# Patient Record
Sex: MALE | Race: WHITE | Employment: OTHER | URBAN - NONMETROPOLITAN AREA
[De-identification: names, ages, dates, MRNs, and addresses within clinical notes are randomized per-mention and may not be internally consistent; named-entity substitution may affect disease eponyms.]

---

## 2017-08-31 RX ORDER — LISINOPRIL 10 MG/1
TABLET ORAL
Qty: 90 TABLET | Refills: 1 | Status: SHIPPED | OUTPATIENT
Start: 2017-08-31

## 2024-07-25 ENCOUNTER — APPOINTMENT (OUTPATIENT)
Dept: CT IMAGING | Age: 79
End: 2024-07-25
Payer: OTHER GOVERNMENT

## 2024-07-25 ENCOUNTER — APPOINTMENT (OUTPATIENT)
Dept: GENERAL RADIOLOGY | Age: 79
End: 2024-07-25
Payer: OTHER GOVERNMENT

## 2024-07-25 ENCOUNTER — HOSPITAL ENCOUNTER (EMERGENCY)
Age: 79
Discharge: ANOTHER HOSPITAL | End: 2024-07-25
Attending: EMERGENCY MEDICINE
Payer: OTHER GOVERNMENT

## 2024-07-25 VITALS
SYSTOLIC BLOOD PRESSURE: 108 MMHG | RESPIRATION RATE: 18 BRPM | WEIGHT: 125 LBS | HEIGHT: 65 IN | TEMPERATURE: 98.2 F | BODY MASS INDEX: 20.83 KG/M2 | OXYGEN SATURATION: 96 % | DIASTOLIC BLOOD PRESSURE: 78 MMHG | HEART RATE: 78 BPM

## 2024-07-25 DIAGNOSIS — I63.9 ACUTE CVA (CEREBROVASCULAR ACCIDENT) (HCC): Primary | ICD-10-CM

## 2024-07-25 LAB
ALBUMIN SERPL-MCNC: 4.1 G/DL (ref 3.5–5.2)
ALP SERPL-CCNC: 81 U/L (ref 40–130)
ALT SERPL-CCNC: 20 U/L (ref 5–41)
ANION GAP SERPL CALCULATED.3IONS-SCNC: 9 MMOL/L (ref 7–19)
AST SERPL-CCNC: 23 U/L (ref 5–40)
BASOPHILS # BLD: 0 K/UL (ref 0–0.2)
BASOPHILS NFR BLD: 0.3 % (ref 0–1)
BILIRUB SERPL-MCNC: 0.4 MG/DL (ref 0.2–1.2)
BUN SERPL-MCNC: 25 MG/DL (ref 8–23)
CALCIUM SERPL-MCNC: 9.2 MG/DL (ref 8.8–10.2)
CHLORIDE SERPL-SCNC: 103 MMOL/L (ref 98–111)
CO2 SERPL-SCNC: 26 MMOL/L (ref 22–29)
CREAT SERPL-MCNC: 1 MG/DL (ref 0.5–1.2)
EKG P AXIS: 95 DEGREES
EKG P-R INTERVAL: 146 MS
EKG Q-T INTERVAL: 382 MS
EKG QRS DURATION: 96 MS
EKG QTC CALCULATION (BAZETT): 429 MS
EKG T AXIS: 65 DEGREES
EOSINOPHIL # BLD: 0.1 K/UL (ref 0–0.6)
EOSINOPHIL NFR BLD: 0.8 % (ref 0–5)
ERYTHROCYTE [DISTWIDTH] IN BLOOD BY AUTOMATED COUNT: 13.2 % (ref 11.5–14.5)
GLUCOSE BLD-MCNC: 105 MG/DL (ref 70–99)
GLUCOSE SERPL-MCNC: 119 MG/DL (ref 74–109)
HCT VFR BLD AUTO: 41.5 % (ref 42–52)
HGB BLD-MCNC: 13.4 G/DL (ref 14–18)
IMM GRANULOCYTES # BLD: 0 K/UL
INR PPP: 0.96 (ref 0.88–1.18)
LYMPHOCYTES # BLD: 1.6 K/UL (ref 1.1–4.5)
LYMPHOCYTES NFR BLD: 22.6 % (ref 20–40)
MCH RBC QN AUTO: 30.6 PG (ref 27–31)
MCHC RBC AUTO-ENTMCNC: 32.3 G/DL (ref 33–37)
MCV RBC AUTO: 94.7 FL (ref 80–94)
MONOCYTES # BLD: 0.5 K/UL (ref 0–0.9)
MONOCYTES NFR BLD: 6.8 % (ref 0–10)
NEUTROPHILS # BLD: 5 K/UL (ref 1.5–7.5)
NEUTS SEG NFR BLD: 69.2 % (ref 50–65)
PERFORMED ON: ABNORMAL
PLATELET # BLD AUTO: 210 K/UL (ref 130–400)
PMV BLD AUTO: 10.4 FL (ref 9.4–12.4)
POTASSIUM SERPL-SCNC: 3.9 MMOL/L (ref 3.5–5)
PROT SERPL-MCNC: 6.9 G/DL (ref 6.6–8.7)
PROTHROMBIN TIME: 12.5 SEC (ref 12–14.6)
RBC # BLD AUTO: 4.38 M/UL (ref 4.7–6.1)
SODIUM SERPL-SCNC: 138 MMOL/L (ref 136–145)
TROPONIN, HIGH SENSITIVITY: 15 NG/L (ref 0–22)
WBC # BLD AUTO: 7.2 K/UL (ref 4.8–10.8)

## 2024-07-25 PROCEDURE — 70498 CT ANGIOGRAPHY NECK: CPT

## 2024-07-25 PROCEDURE — 0042T CT BRAIN PERFUSION: CPT

## 2024-07-25 PROCEDURE — 36415 COLL VENOUS BLD VENIPUNCTURE: CPT

## 2024-07-25 PROCEDURE — 80053 COMPREHEN METABOLIC PANEL: CPT

## 2024-07-25 PROCEDURE — 82962 GLUCOSE BLOOD TEST: CPT

## 2024-07-25 PROCEDURE — 99285 EMERGENCY DEPT VISIT HI MDM: CPT

## 2024-07-25 PROCEDURE — 70450 CT HEAD/BRAIN W/O DYE: CPT

## 2024-07-25 PROCEDURE — 84484 ASSAY OF TROPONIN QUANT: CPT

## 2024-07-25 PROCEDURE — 71045 X-RAY EXAM CHEST 1 VIEW: CPT

## 2024-07-25 PROCEDURE — 85610 PROTHROMBIN TIME: CPT

## 2024-07-25 PROCEDURE — 85025 COMPLETE CBC W/AUTO DIFF WBC: CPT

## 2024-07-25 PROCEDURE — 6360000004 HC RX CONTRAST MEDICATION: Performed by: PHYSICIAN ASSISTANT

## 2024-07-25 RX ORDER — LABETALOL HYDROCHLORIDE 5 MG/ML
20 INJECTION, SOLUTION INTRAVENOUS ONCE
Status: DISCONTINUED | OUTPATIENT
Start: 2024-07-25 | End: 2024-07-25 | Stop reason: HOSPADM

## 2024-07-25 RX ADMIN — IOPAMIDOL 125 ML: 755 INJECTION, SOLUTION INTRAVENOUS at 08:46

## 2024-07-25 ASSESSMENT — ENCOUNTER SYMPTOMS
DIARRHEA: 0
PHOTOPHOBIA: 0
SHORTNESS OF BREATH: 0
ABDOMINAL PAIN: 0
NAUSEA: 0
VOMITING: 0

## 2024-07-25 NOTE — ED NOTES
Rica Simon RN called Code Stroke, CT aware 0829  Announced Over PA 0829  Alerted Dr Momin 0835  Alerted Stroke Coordinator 0838  LKW 2100 last night

## 2024-07-25 NOTE — ED PROVIDER NOTES
Attending Supervisory Note/Shared Visit   I have personally performed a face to face diagnostic evaluation on this patient. I have reviewed the mid-level’s findings and agree.     Briefly, patient presents to the ED for evaluation regarding onset of facial drooping and motor weakness.  He noticed the symptoms when he awoke this morning and was transported here to the ED by his son.  Patient does have a prior history of atrial fibrillation and is maintained on Eliquis therapy.  No prior history of previous CVA.  Patient's wife and patient estimate that he went to bed around 9 PM last night and was his normal self at that time.    On my examination patient is alert and answering basic questions.  He is noted to have left-sided facial drooping and motor weakness involving the left upper extremity.  His lungs are clear bilaterally.  His vital signs are stable.      Patient's CT imaging demonstrates evidence of LVO.  He is not a candidate for thrombolytics due to last known well of 9 PM last night.       CT Brain:      CT Perfusion:      Case was discussed with [facility] who is agreeable to auto-accept patient in ED to ED transfer.  Accepting provider is Dr. Herbert.  Patient will be transferred via aeromedical transport.    Case was discussed with neurointerventional fellow.    CRITICAL CARE TIME   Total Critical Care time was 44 minutes, excluding separately reportable procedures.  There was a high probability of clinically significant/life threatening deterioration in the patient's condition which required my urgent intervention.  Patient required my immediate presence at the bedside.  Multiple reexaminations were undertaken throughout ED course of care.  Time was spent reviewing plan of care with ED nursing staff.  Time is inclusive of  and clinical documentation.        FINAL IMPRESSION      1. Acute CVA (cerebrovascular accident) (HCC)          Owen Hogan MD  Attending Emergency 
within normal limits   TROPONIN   PROTIME-INR   POCT GLUCOSE       All other labs were within normal range or not returned as of this dictation.    EMERGENCY DEPARTMENT COURSE and DIFFERENTIAL DIAGNOSIS/MDM:   Vitals:    Vitals:    07/25/24 0828 07/25/24 0851   BP: (!) 142/115 126/72   Pulse: 71 76   Resp: 18 18   Temp: 98.4 °F (36.9 °C)    SpO2: 96% 96%   Weight: 56.7 kg (125 lb)    Height: 1.651 m (5' 5\")        MDM  Radiology notes subacute ischemic infarct. [facility] was immediately called due to concern for LVO noted on perfusion scan. Patient was also seen by attending Dr Hogan who spoke with [facility]. Patient was accepted at [facility]. CT perfusion shows MCA ischemic infarct per radiologist. EKG and trop neg for cardiac etiology. Blood work otherwise unremarkable. Plan for air transport. See attending note for further detail.     CONSULTS:  [facility]    PROCEDURES:  Unless otherwise noted below, none     Procedures    FINAL IMPRESSION      1. Acute CVA (cerebrovascular accident) (HCC)          DISPOSITION/PLAN   DISPOSITION Decision To Transfer 07/25/2024 08:53:07 AM      PATIENT REFERRED TO:  No follow-up provider specified.    DISCHARGE MEDICATIONS:  New Prescriptions    No medications on file          (Please note that portions of this note were completed with a voice recognition program.  Efforts were made to edit thedictations but occasionally words are mis-transcribed.)    SHERIN Whitaker (electronically signed)       Ulisses Garcia PA  07/25/24 0915

## 2024-07-29 ENCOUNTER — TELEPHONE (OUTPATIENT)
Dept: UROLOGY | Age: 79
End: 2024-07-29

## 2024-07-29 LAB
EKG P AXIS: 95 DEGREES
EKG P-R INTERVAL: 146 MS
EKG Q-T INTERVAL: 382 MS
EKG QRS DURATION: 96 MS
EKG QTC CALCULATION (BAZETT): 429 MS
EKG T AXIS: 65 DEGREES

## 2024-07-29 NOTE — TELEPHONE ENCOUNTER
Jairon Daughter May called to schedule a  hospital follow up for a Cath removal due to Prostate swelling .     Please be advised that the best time to call him to accommodate their needs is  as soon as possible .     Thank you.

## 2024-08-02 ENCOUNTER — OFFICE VISIT (OUTPATIENT)
Dept: UROLOGY | Age: 79
End: 2024-08-02
Payer: MEDICARE

## 2024-08-02 VITALS — TEMPERATURE: 98.6 F | WEIGHT: 125 LBS | HEIGHT: 65 IN | BODY MASS INDEX: 20.83 KG/M2

## 2024-08-02 DIAGNOSIS — R33.8 BENIGN PROSTATIC HYPERPLASIA WITH URINARY RETENTION: Primary | ICD-10-CM

## 2024-08-02 DIAGNOSIS — N40.1 BENIGN PROSTATIC HYPERPLASIA WITH URINARY RETENTION: Primary | ICD-10-CM

## 2024-08-02 DIAGNOSIS — Z87.898 HISTORY OF ELEVATED PSA: ICD-10-CM

## 2024-08-02 DIAGNOSIS — N40.2 PROSTATE NODULE: ICD-10-CM

## 2024-08-02 PROCEDURE — 99204 OFFICE O/P NEW MOD 45 MIN: CPT | Performed by: NURSE PRACTITIONER

## 2024-08-02 PROCEDURE — 1123F ACP DISCUSS/DSCN MKR DOCD: CPT | Performed by: NURSE PRACTITIONER

## 2024-08-02 PROCEDURE — G8427 DOCREV CUR MEDS BY ELIG CLIN: HCPCS | Performed by: NURSE PRACTITIONER

## 2024-08-02 PROCEDURE — G8420 CALC BMI NORM PARAMETERS: HCPCS | Performed by: NURSE PRACTITIONER

## 2024-08-02 PROCEDURE — 4004F PT TOBACCO SCREEN RCVD TLK: CPT | Performed by: NURSE PRACTITIONER

## 2024-08-02 RX ORDER — ATORVASTATIN CALCIUM 80 MG/1
80 TABLET, FILM COATED ORAL DAILY
COMMUNITY
Start: 2024-07-27

## 2024-08-02 RX ORDER — TAMSULOSIN HYDROCHLORIDE 0.4 MG/1
CAPSULE ORAL
COMMUNITY
Start: 2024-07-27

## 2024-08-02 ASSESSMENT — ENCOUNTER SYMPTOMS
VOMITING: 0
ABDOMINAL PAIN: 0
NAUSEA: 0
BACK PAIN: 0
ABDOMINAL DISTENTION: 0

## 2024-08-02 NOTE — PROGRESS NOTES
Jairon Rose is a 79 y.o. male who presents today   Chief Complaint   Patient presents with    New Patient     I am here today for my fill & pull.       Patient is an 79-year-old male presents to the clinic today for voiding trial.  He is a new patient to me.  He presented to the [facility] ER on 7/25/24 confirmed MCA ischemic infarct transferred to [facility] for thrombectomy.  He was discharged on 7/27/2024.  During that admission he was found to have a lung tumor suggestive to be malignant on a CT of the chest however he does not want to pursue treatment for this.  He developed urinary retention likely secondary to CVA.  Multiple attempts to place the catheter by nursing staff were unsuccessful therefore consult to urology for a complex Chapman catheter insertion.  He was started on Flomax and has been on this for about a week.  He has a long history of BPH has never been on medications in the past.  History of elevated PSAs has 2 previous biopsies these were negative however he no longer obtains PSAs.  Previously seen by Dr. Singer.  At baseline he does have a history of incomplete bladder emptying, frequency, urgency, weak stream, nocturia.  He is eager to have his Chapman removed today.      History reviewed. No pertinent past medical history.    History reviewed. No pertinent surgical history.    Current Outpatient Medications   Medication Sig Dispense Refill    atorvastatin (LIPITOR) 80 MG tablet Take 1 tablet by mouth daily      tamsulosin (FLOMAX) 0.4 MG capsule TAKE 1 CAPSULE BY MOUTH ONCE DAILY IN THE EVENING      metoprolol tartrate (LOPRESSOR) 25 MG tablet Take 1 tablet by mouth 2 times daily      lisinopril (PRINIVIL;ZESTRIL) 10 MG tablet TAKE ONE TABLET BY MOUTH ONCE DAILY 90 tablet 1    metoprolol tartrate (LOPRESSOR) 50 MG tablet TAKE ONE-HALF TABLET BY MOUTH ONCE DAILY 45 tablet 5     No current facility-administered medications for this visit.       No Known Allergies    Social History     Socioeconomic

## 2024-08-02 NOTE — PROGRESS NOTES
Patient of Yeny Whyte presents today for voiding trial post BPH with retention. The patient denies any fever, chills or  N&V. After patient had given consent, using the catheter in place, 240cc of sterile water was installed into the bladder with no complications. Patient was able to void 100.     Yeny Whyte was in office at time of procedure.     Patient was advised to drink clear fluids for the next couple hours and urinate. Patient was advised they may experience some blood in the urine and burning with urination for the next couple days. If the patient is unable to urinate or develops fever, chills, N&V or suprapubic pain, they will call office for an appt at clinic or seek medical treatment at nearest ER, PCP or Urgent Care if after hours. Patient verbalized understanding and all questions were answered.Patient advised to call the office with any questions or concerns.     Patient is to follow up as scheduled. Procedure Note (8/2/24):  After receiving verbal orders from Yeny Whyte, I was instructed to place urethral osman catheter for being unable to pass fill & pull. Using sterile technique, patient was cleaned with Hibiclens and sterile water solution. A 18f coude catheter was inserted into the bladder, the bladder was drained of 400cc of urine, and 10 mL of sterile water was used to fill up the balloon. The catheter was then hooked up to a leg (leg/bedside) drainage bag.   The patient tolerated the procedure well.     Patient should follow up in 1 week as scheduled for voiding trial.

## 2024-08-09 ENCOUNTER — OFFICE VISIT (OUTPATIENT)
Dept: UROLOGY | Age: 79
End: 2024-08-09

## 2024-08-09 VITALS — HEIGHT: 65 IN | BODY MASS INDEX: 20.83 KG/M2 | WEIGHT: 125 LBS

## 2024-08-09 DIAGNOSIS — Z87.898 HISTORY OF ELEVATED PSA: ICD-10-CM

## 2024-08-09 DIAGNOSIS — R33.8 BENIGN PROSTATIC HYPERPLASIA WITH URINARY RETENTION: ICD-10-CM

## 2024-08-09 DIAGNOSIS — N40.2 PROSTATE NODULE: ICD-10-CM

## 2024-08-09 DIAGNOSIS — N40.1 BENIGN PROSTATIC HYPERPLASIA WITH URINARY RETENTION: ICD-10-CM

## 2024-08-09 DIAGNOSIS — R33.9 URINARY RETENTION: Primary | ICD-10-CM

## 2024-08-09 RX ORDER — TAMSULOSIN HYDROCHLORIDE 0.4 MG/1
0.8 CAPSULE ORAL NIGHTLY
Qty: 180 CAPSULE | Refills: 3 | Status: ON HOLD | OUTPATIENT
Start: 2024-08-09

## 2024-08-09 ASSESSMENT — ENCOUNTER SYMPTOMS
VOMITING: 0
NAUSEA: 0
ABDOMINAL DISTENTION: 0
BACK PAIN: 0
ABDOMINAL PAIN: 0

## 2024-08-09 NOTE — PROGRESS NOTES
Patient of AUBREE Argueta presents today for voiding trial post episode of urinary retention. The patient denies any fever, chills or  N&V. After patient had given consent, using the catheter in place, 240cc of sterile water was installed into the bladder with no complications. Patient was able to void 225cc.     AUBREE Argueta was in office at time of procedure.     Patient was advised to drink clear fluids for the next couple hours and urinate. Patient was advised they may experience some blood in the urine and burning with urination for the next couple days. If the patient is unable to urinate or develops fever, chills, N&V or suprapubic pain, they will call office for an appt at clinic or seek medical treatment at nearest ER, PCP or Urgent Care if after hours. Patient verbalized understanding and all questions were answered.Patient advised to call the office with any questions or concerns.     Patient is to follow up as scheduled.   
  Pulmonary:      Effort: Pulmonary effort is normal. No respiratory distress.   Abdominal:      General: There is no distension.      Tenderness: There is no abdominal tenderness. There is no right CVA tenderness or left CVA tenderness.   Neurological:      Mental Status: He is alert and oriented to person, place, and time. Mental status is at baseline.   Psychiatric:         Mood and Affect: Mood normal.         Behavior: Behavior normal.         ASSESSMENT/PLAN  1. Urinary retention  Voiding trial today.  Patient was able to urinate to 25 mL of the 240 mL instilled into his bladder.  This is an improvement from his previous voiding trial.  He is now maintained on Flomax 0.8 mg.  He is hopeful he will not need catheter replacement.  We will plan on having him follow-up in about 2 weeks for bladder scan.  If he is doing well on medical management, he can continue this therapy.  If he is unable to empty his bladder today, patient is aware he will need to pursue CIC versus catheter placement.  - tamsulosin (FLOMAX) 0.4 MG capsule; Take 2 capsules by mouth at bedtime  Dispense: 180 capsule; Refill: 3    2. Benign prostatic hyperplasia with urinary retention  Enlarged prostate with 2 episodes of urinary retention.  Was able to void in office.  Continue Flomax.  - tamsulosin (FLOMAX) 0.4 MG capsule; Take 2 capsules by mouth at bedtime  Dispense: 180 capsule; Refill: 3    3. Prostate nodule  4. History of elevated PSA  Patient with prior history of elevated PSA with 2 prior negative biopsies.  I do not have previous records regarding the pathologies.  Patient was noted to have a prostate nodule on his AUGIE by AUBREE Saini.  Patient does not wish to pursue any further evaluation regarding screening for prostate cancer.      No orders of the defined types were placed in this encounter.       Return in about 2 weeks (around 8/23/2024) for recheck pvr.    An electronic signature was used to authenticate this note.    SRIKANTH

## 2024-08-11 ENCOUNTER — HOSPITAL ENCOUNTER (INPATIENT)
Age: 79
LOS: 2 days | Discharge: HOME | DRG: 683 | End: 2024-08-13
Attending: PEDIATRICS | Admitting: INTERNAL MEDICINE
Payer: OTHER GOVERNMENT

## 2024-08-11 DIAGNOSIS — R33.9 URINARY RETENTION: Primary | ICD-10-CM

## 2024-08-11 DIAGNOSIS — N13.9 OBSTRUCTIVE UROPATHY: ICD-10-CM

## 2024-08-11 PROBLEM — D72.829 LEUKOCYTOSIS: Status: ACTIVE | Noted: 2024-08-11

## 2024-08-11 PROBLEM — N17.9 AKI (ACUTE KIDNEY INJURY) (HCC): Status: ACTIVE | Noted: 2024-08-11

## 2024-08-11 LAB
ALBUMIN SERPL-MCNC: 4 G/DL (ref 3.5–5.2)
ALP SERPL-CCNC: 114 U/L (ref 40–130)
ALT SERPL-CCNC: 24 U/L (ref 5–41)
ANION GAP SERPL CALCULATED.3IONS-SCNC: 14 MMOL/L (ref 7–19)
AST SERPL-CCNC: 29 U/L (ref 5–40)
BACTERIA URNS QL MICRO: NEGATIVE /HPF
BASOPHILS # BLD: 0 K/UL (ref 0–0.2)
BASOPHILS NFR BLD: 0.1 % (ref 0–1)
BILIRUB SERPL-MCNC: 0.8 MG/DL (ref 0.2–1.2)
BILIRUB UR QL STRIP: NEGATIVE
BUN SERPL-MCNC: 32 MG/DL (ref 8–23)
CALCIUM SERPL-MCNC: 9.4 MG/DL (ref 8.8–10.2)
CHLORIDE SERPL-SCNC: 99 MMOL/L (ref 98–111)
CLARITY UR: CLEAR
CO2 SERPL-SCNC: 21 MMOL/L (ref 22–29)
COLOR UR: YELLOW
CREAT SERPL-MCNC: 2.7 MG/DL (ref 0.5–1.2)
CREAT UR-MCNC: 146.2 MG/DL (ref 39–259)
CRYSTALS URNS MICRO: ABNORMAL /HPF
EOSINOPHIL # BLD: 0 K/UL (ref 0–0.6)
EOSINOPHIL NFR BLD: 0.1 % (ref 0–5)
EOSINOPHIL URNS QL MICRO: NORMAL
EPI CELLS #/AREA URNS AUTO: 0 /HPF (ref 0–5)
ERYTHROCYTE [DISTWIDTH] IN BLOOD BY AUTOMATED COUNT: 12.7 % (ref 11.5–14.5)
GLUCOSE SERPL-MCNC: 148 MG/DL (ref 74–109)
GLUCOSE UR STRIP.AUTO-MCNC: NEGATIVE MG/DL
HCT VFR BLD AUTO: 36.1 % (ref 42–52)
HGB BLD-MCNC: 12.4 G/DL (ref 14–18)
HGB UR STRIP.AUTO-MCNC: ABNORMAL MG/L
HYALINE CASTS #/AREA URNS AUTO: 0 /HPF (ref 0–8)
IMM GRANULOCYTES # BLD: 0.1 K/UL
KETONES UR STRIP.AUTO-MCNC: NEGATIVE MG/DL
LACTATE BLDV-SCNC: 1.2 MMOL/L (ref 0.5–1.9)
LEUKOCYTE ESTERASE UR QL STRIP.AUTO: NEGATIVE
LYMPHOCYTES # BLD: 1 K/UL (ref 1.1–4.5)
LYMPHOCYTES NFR BLD: 6.1 % (ref 20–40)
MCH RBC QN AUTO: 31 PG (ref 27–31)
MCHC RBC AUTO-ENTMCNC: 34.3 G/DL (ref 33–37)
MCV RBC AUTO: 90.3 FL (ref 80–94)
MONOCYTES # BLD: 0.9 K/UL (ref 0–0.9)
MONOCYTES NFR BLD: 5.7 % (ref 0–10)
NEUTROPHILS # BLD: 13.6 K/UL (ref 1.5–7.5)
NEUTS SEG NFR BLD: 87.3 % (ref 50–65)
NITRITE UR QL STRIP.AUTO: NEGATIVE
OSMOLALITY URINE: 516 MOSM/KG (ref 250–1200)
PH UR STRIP.AUTO: 5.5 [PH] (ref 5–8)
PLATELET # BLD AUTO: 305 K/UL (ref 130–400)
PMV BLD AUTO: 10.1 FL (ref 9.4–12.4)
POTASSIUM SERPL-SCNC: 3.9 MMOL/L (ref 3.5–5)
PROT SERPL-MCNC: 7.1 G/DL (ref 6.6–8.7)
PROT UR STRIP.AUTO-MCNC: ABNORMAL MG/DL
RBC # BLD AUTO: 4 M/UL (ref 4.7–6.1)
RBC #/AREA URNS AUTO: 203 /HPF (ref 0–4)
SODIUM SERPL-SCNC: 134 MMOL/L (ref 136–145)
SODIUM UR-SCNC: 66 MMOL/L
SP GR UR STRIP.AUTO: 1.02 (ref 1–1.03)
UROBILINOGEN UR STRIP.AUTO-MCNC: 0.2 E.U./DL
WBC # BLD AUTO: 15.6 K/UL (ref 4.8–10.8)
WBC #/AREA URNS AUTO: 18 /HPF (ref 0–5)

## 2024-08-11 PROCEDURE — 99222 1ST HOSP IP/OBS MODERATE 55: CPT | Performed by: NURSE PRACTITIONER

## 2024-08-11 PROCEDURE — 99285 EMERGENCY DEPT VISIT HI MDM: CPT

## 2024-08-11 PROCEDURE — 83935 ASSAY OF URINE OSMOLALITY: CPT

## 2024-08-11 PROCEDURE — 80053 COMPREHEN METABOLIC PANEL: CPT

## 2024-08-11 PROCEDURE — 6370000000 HC RX 637 (ALT 250 FOR IP): Performed by: NURSE PRACTITIONER

## 2024-08-11 PROCEDURE — 2580000003 HC RX 258: Performed by: NURSE PRACTITIONER

## 2024-08-11 PROCEDURE — 87040 BLOOD CULTURE FOR BACTERIA: CPT

## 2024-08-11 PROCEDURE — 87086 URINE CULTURE/COLONY COUNT: CPT

## 2024-08-11 PROCEDURE — 82570 ASSAY OF URINE CREATININE: CPT

## 2024-08-11 PROCEDURE — 6370000000 HC RX 637 (ALT 250 FOR IP): Performed by: PEDIATRICS

## 2024-08-11 PROCEDURE — 87186 SC STD MICRODIL/AGAR DIL: CPT

## 2024-08-11 PROCEDURE — 84300 ASSAY OF URINE SODIUM: CPT

## 2024-08-11 PROCEDURE — 51702 INSERT TEMP BLADDER CATH: CPT

## 2024-08-11 PROCEDURE — 2580000003 HC RX 258: Performed by: PEDIATRICS

## 2024-08-11 PROCEDURE — 1200000000 HC SEMI PRIVATE

## 2024-08-11 PROCEDURE — 36415 COLL VENOUS BLD VENIPUNCTURE: CPT

## 2024-08-11 PROCEDURE — 6360000002 HC RX W HCPCS: Performed by: NURSE PRACTITIONER

## 2024-08-11 PROCEDURE — 81001 URINALYSIS AUTO W/SCOPE: CPT

## 2024-08-11 PROCEDURE — 87205 SMEAR GRAM STAIN: CPT

## 2024-08-11 PROCEDURE — 87077 CULTURE AEROBIC IDENTIFY: CPT

## 2024-08-11 PROCEDURE — 85025 COMPLETE CBC W/AUTO DIFF WBC: CPT

## 2024-08-11 PROCEDURE — 83605 ASSAY OF LACTIC ACID: CPT

## 2024-08-11 RX ORDER — LIDOCAINE HYDROCHLORIDE 20 MG/ML
JELLY TOPICAL PRN
Status: DISCONTINUED | OUTPATIENT
Start: 2024-08-11 | End: 2024-08-13 | Stop reason: HOSPADM

## 2024-08-11 RX ORDER — 0.9 % SODIUM CHLORIDE 0.9 %
1000 INTRAVENOUS SOLUTION INTRAVENOUS ONCE
Status: COMPLETED | OUTPATIENT
Start: 2024-08-11 | End: 2024-08-11

## 2024-08-11 RX ORDER — POLYETHYLENE GLYCOL 3350 17 G/17G
17 POWDER, FOR SOLUTION ORAL DAILY PRN
Status: DISCONTINUED | OUTPATIENT
Start: 2024-08-11 | End: 2024-08-13 | Stop reason: HOSPADM

## 2024-08-11 RX ORDER — ONDANSETRON 2 MG/ML
4 INJECTION INTRAMUSCULAR; INTRAVENOUS EVERY 6 HOURS PRN
Status: DISCONTINUED | OUTPATIENT
Start: 2024-08-11 | End: 2024-08-13 | Stop reason: HOSPADM

## 2024-08-11 RX ORDER — ENOXAPARIN SODIUM 100 MG/ML
30 INJECTION SUBCUTANEOUS EVERY 24 HOURS
Status: DISCONTINUED | OUTPATIENT
Start: 2024-08-11 | End: 2024-08-13 | Stop reason: HOSPADM

## 2024-08-11 RX ORDER — SODIUM CHLORIDE 0.9 % (FLUSH) 0.9 %
5-40 SYRINGE (ML) INJECTION EVERY 12 HOURS SCHEDULED
Status: DISCONTINUED | OUTPATIENT
Start: 2024-08-11 | End: 2024-08-13 | Stop reason: HOSPADM

## 2024-08-11 RX ORDER — TAMSULOSIN HYDROCHLORIDE 0.4 MG/1
0.8 CAPSULE ORAL NIGHTLY
Status: DISCONTINUED | OUTPATIENT
Start: 2024-08-11 | End: 2024-08-13 | Stop reason: HOSPADM

## 2024-08-11 RX ORDER — FINASTERIDE 5 MG/1
5 TABLET, FILM COATED ORAL DAILY
Status: DISCONTINUED | OUTPATIENT
Start: 2024-08-11 | End: 2024-08-13 | Stop reason: HOSPADM

## 2024-08-11 RX ORDER — ACETAMINOPHEN 325 MG/1
650 TABLET ORAL EVERY 6 HOURS PRN
Status: DISCONTINUED | OUTPATIENT
Start: 2024-08-11 | End: 2024-08-13 | Stop reason: HOSPADM

## 2024-08-11 RX ORDER — SODIUM CHLORIDE 9 MG/ML
INJECTION, SOLUTION INTRAVENOUS PRN
Status: DISCONTINUED | OUTPATIENT
Start: 2024-08-11 | End: 2024-08-13 | Stop reason: HOSPADM

## 2024-08-11 RX ORDER — SODIUM CHLORIDE 0.9 % (FLUSH) 0.9 %
5-40 SYRINGE (ML) INJECTION PRN
Status: DISCONTINUED | OUTPATIENT
Start: 2024-08-11 | End: 2024-08-13 | Stop reason: HOSPADM

## 2024-08-11 RX ORDER — ACETAMINOPHEN 650 MG/1
650 SUPPOSITORY RECTAL EVERY 6 HOURS PRN
Status: DISCONTINUED | OUTPATIENT
Start: 2024-08-11 | End: 2024-08-13 | Stop reason: HOSPADM

## 2024-08-11 RX ORDER — SODIUM CHLORIDE 9 MG/ML
INJECTION, SOLUTION INTRAVENOUS CONTINUOUS
Status: DISCONTINUED | OUTPATIENT
Start: 2024-08-11 | End: 2024-08-13 | Stop reason: HOSPADM

## 2024-08-11 RX ORDER — ONDANSETRON 4 MG/1
4 TABLET, ORALLY DISINTEGRATING ORAL EVERY 8 HOURS PRN
Status: DISCONTINUED | OUTPATIENT
Start: 2024-08-11 | End: 2024-08-13 | Stop reason: HOSPADM

## 2024-08-11 RX ADMIN — FINASTERIDE 5 MG: 5 TABLET, FILM COATED ORAL at 17:52

## 2024-08-11 RX ADMIN — ENOXAPARIN SODIUM 30 MG: 100 INJECTION SUBCUTANEOUS at 16:26

## 2024-08-11 RX ADMIN — SODIUM CHLORIDE 1000 ML: 9 INJECTION, SOLUTION INTRAVENOUS at 13:50

## 2024-08-11 RX ADMIN — TAMSULOSIN HYDROCHLORIDE 0.8 MG: 0.4 CAPSULE ORAL at 19:44

## 2024-08-11 RX ADMIN — LIDOCAINE HYDROCHLORIDE: 20 JELLY TOPICAL at 12:09

## 2024-08-11 RX ADMIN — SODIUM CHLORIDE: 9 INJECTION, SOLUTION INTRAVENOUS at 16:29

## 2024-08-11 NOTE — PROGRESS NOTES
4 Eyes Skin Assessment     NAME:  Jairon Rose  YOB: 1945  MEDICAL RECORD NUMBER:  [id number]    The patient is being assessed for  Admission    I agree that at least one RN has performed a thorough Head to Toe Skin Assessment on the patient. ALL assessment sites listed below have been assessed.      Areas assessed by both nurses:    {Pressure Areas Assessed:79655}        Does the Patient have a Wound? No noted wound(s)       Tello Prevention initiated by RN: No  Wound Care Orders initiated by RN: No    Pressure Injury (Stage 3,4, Unstageable, DTI, NWPT, and Complex wounds) if present, place Wound referral order by RN under : No    New Ostomies, if present place, Ostomy referral order under : No     Nurse 1 eSignature: Electronically signed by Mimi Lepe RN on 8/11/24 at 6:23 PM CDT    **SHARE this note so that the co-signing nurse can place an eSignature**    Nurse 2 eSignature: {Esignature:[id number]}

## 2024-08-11 NOTE — CONSULTS
Urology Consult Note    Patient:  Jairon Rose  YOB: 1945  Date of Service: 8/11/2024  MRN: [id number]   Acct: [id number]   Primary Care Physician: Segundo Phipps MD  Advance Directive: Full Code  Admit Date: 8/11/2024       Hospital Day: 0    Chief Complaint: \"He has been having trouble urinating since yesterday at least, maybe even before that,\" states his son.    History:  HPI   Patient presented to the emergency room earlier today with his son for urinary retention.  Patient is quite lethargic, so his son does provide most of the history.  Patient is resting in bed with eyes closed.  Does respond intermittently.  Patient was seen in our office on Friday for voiding trial.  This was his second voiding trial in as many weeks. He initially presented to the ER on 7/25/2024 with a confirmed MCA ischemic infarct and transferred to [facility] for thrombectomy.  During that admission, he was also incidentally found to have a lung tumor with malignant appearance on CT of the chest, although, patient is not planning on pursuing treatment for this.  During his hospitalization, he developed urinary retention, most likely secondary to CVA.  There were multiple attempts by the nursing staff to place his catheter but ultimately required a complex Chapman catheter insertion by the urologist there.  He was discharged with Flomax, and after failing voiding trial last week, Flomax was increased to 0.8 mg.  Prior AUGIE revealed 70 g prostate with nodule.    Again, patient presented to the office on Friday for voiding trial.  He was able to urinate while in the office, but over the next couple days, patient expressed to his family he was having some issues with voiding with his urinary stream being only a dribble.  His son believes the patient may have been exaggerating a bit on the amount of urination occurring in hopes to avoid catheter replacement.  A little over 500 mL of urine was noted to be within his bladder on his

## 2024-08-11 NOTE — ED NOTES
ED TO INPATIENT SBAR HANDOFF    Patient Name: Jairon Rose   : 1945  79 y.o.   Family/Caregiver Present: Yes  Code Status Order: Full Code    C-SSRS: Risk of Suicide: No Risk  Sitter No  Restraints:         Situation  Chief Complaint:   Chief Complaint   Patient presents with    Dysuria     Ongoing issues with dysuria, multiple catheter insertions that have been placed by Urology; thelma Magallanes     Patient Diagnosis: BREE (acute kidney injury) (HCC) [N17.9]     Brief Description of Patient's Condition: Pt being admitted for BREE. Pt came to ED today with c/o of dysuria and retention. For the last two days pt has been having urgency but not able to produce much urine. Pt had a stroke two weeks ago and has been having urinary issues and has had multiple catheter insertions. Pt has a 16g osman in at this time. Lab recently called and said that they needed more urine to run more tests. Catheter was just emptied so  more urine will need to be sent for further testing. Cultures ordered by [facility] and lactic recently sent. Pt on RA and oriented x4. Pt has had trouble sleeping the last few days but is resting now.  Mental Status: oriented, alert, coherent, logical, and thought processes intact  Arrived from: home    Imaging:   US RENAL COMPLETE    (Results Pending)     COVID-19 Results:   Internal Administration   First Dose      Second Dose           Last COVID Lab No results found for: \"SARS-COV-2\"        Abnormal labs:   Abnormal Labs Reviewed   CBC WITH AUTO DIFFERENTIAL - Abnormal; Notable for the following components:       Result Value    WBC 15.6 (*)     RBC 4.00 (*)     Hemoglobin 12.4 (*)     Hematocrit 36.1 (*)     Neutrophils % 87.3 (*)     Lymphocytes % 6.1 (*)     Neutrophils Absolute 13.6 (*)     Lymphocytes Absolute 1.0 (*)     All other components within normal limits   COMPREHENSIVE METABOLIC PANEL - Abnormal; Notable for the following components:    Sodium 134 (*)     CO2 21 (*)     Glucose

## 2024-08-11 NOTE — H&P
[facility]      Hospitalist - History & Physical      PCP: Segundo Phipps MD    Date of Admission: 8/11/2024    Date of Service: 8/11/2024    Chief Complaint:  Difficulty urinating     History Of Present Illness:   The patient is a 79 y.o. male who presented to [facility] ED for evaluation of difficulty with urination. Pt has history of stroke, HTN and BPH.    Pt is here with his son who assists with history. Pt was evaluated over two weeks ago at [facility] having had thrombectomy for stroke. Pt developed problems while there with urinary retention and osman catheter was placed.     Pt has follow up with Dr. Montgomery's office 3 days ago having had osman catheter removed and having passed voiding trial however developed return in problem urinating yesterday. He has been dribbling urine unable to empty bladder having had suprapubic abdominal discomfort and episode of vomiting today with symptom resolution with osman catheter placement.     In ED, greater than 500ml urine noted on bedside bladder scan per ED nurse. Osman catheter was placed. Creatinine 2.7/bun 32-creatinine 1.0 on 7/25/2024. 1L ns fluid bolus given. Wbc 15.6k,hgb 12.4, platelets 305k, UA micro-negative bacteria, 203 rbc, 18 wbc, 0 epi cells. Pt is admitted observation to hospitalist.     Past Medical History:    Stroke  HTN  BPH  Urinary retention  Lung tumor   Prostate nodule    Past Surgical History:    Thrombectomy     Home Medications:  Prior to Admission medications    Medication Sig Start Date End Date Taking? Authorizing Provider   tamsulosin (FLOMAX) 0.4 MG capsule Take 2 capsules by mouth at bedtime 8/9/24   Sofia Fernando, APRN - CNP   atorvastatin (LIPITOR) 80 MG tablet Take 1 tablet by mouth daily 7/27/24   Provider, MD Frandy   metoprolol tartrate (LOPRESSOR) 25 MG tablet Take 1 tablet by mouth 2 times daily 5/15/24   ProviderFrandy MD   lisinopril (PRINIVIL;ZESTRIL) 10 MG tablet TAKE ONE TABLET BY MOUTH ONCE

## 2024-08-11 NOTE — ED PROVIDER NOTES
WITH REFLEX TO CULTURE - Abnormal; Notable for the following components:    Blood, Urine LARGE (*)     Protein, UA TRACE (*)     All other components within normal limits   MICROSCOPIC URINALYSIS - Abnormal; Notable for the following components:    WBC, UA 18 (*)     RBC,  (*)     All other components within normal limits   BASIC METABOLIC PANEL W/ REFLEX TO MG FOR LOW K - Abnormal; Notable for the following components:    CO2 20 (*)     BUN 30 (*)     Creatinine 1.6 (*)     Est, Glom Filt Rate 43 (*)     Calcium 8.2 (*)     All other components within normal limits   CBC - Abnormal; Notable for the following components:    WBC 10.9 (*)     RBC 3.62 (*)     Hemoglobin 11.2 (*)     Hematocrit 33.3 (*)     All other components within normal limits   CULTURE, BLOOD 1   CULTURE, BLOOD 2   LACTIC ACID   SODIUM, URINE, RANDOM   CREATININE, RANDOM URINE   OSMOLALITY, URINE   EOSINOPHIL SMEAR, URINE               All other labs were within normal range or not returned as of this dictation.    EMERGENCY DEPARTMENT COURSE and DIFFERENTIAL DIAGNOSIS/MDM:   Vitals:    Vitals:    08/11/24 1601 08/11/24 2032 08/12/24 0516 08/12/24 0756   BP: (!) 130/57 (!) 117/50 (!) 130/54    Pulse: 71 59 73 68   Resp:  18 18 16   Temp: 99 °F (37.2 °C) 98.1 °F (36.7 °C) 98.2 °F (36.8 °C)    TempSrc: Oral Oral     SpO2: 92% 97% 97% 96%   Weight:       Height:           MDM     Amount and/or Complexity of Data Reviewed  Clinical lab tests: reviewed  Decide to obtain previous medical records or to obtain history from someone other than the patient: yes    79-year-old male with history of prostate enlargement and urinary retention presents to the emergency department 2 days after having Chapman catheter removed.  Patient has been \"leaking urine\" but has been unable to successfully empty his bladder completely.  Patient is uncomfortable secondary to distention of his bladder.  Chapman catheter placed successfully in the emergency department with

## 2024-08-12 ENCOUNTER — APPOINTMENT (OUTPATIENT)
Dept: ULTRASOUND IMAGING | Age: 79
DRG: 683 | End: 2024-08-12
Payer: OTHER GOVERNMENT

## 2024-08-12 LAB
ANION GAP SERPL CALCULATED.3IONS-SCNC: 13 MMOL/L (ref 7–19)
BUN SERPL-MCNC: 30 MG/DL (ref 8–23)
CALCIUM SERPL-MCNC: 8.2 MG/DL (ref 8.8–10.2)
CHLORIDE SERPL-SCNC: 104 MMOL/L (ref 98–111)
CO2 SERPL-SCNC: 20 MMOL/L (ref 22–29)
CREAT SERPL-MCNC: 1.6 MG/DL (ref 0.5–1.2)
ERYTHROCYTE [DISTWIDTH] IN BLOOD BY AUTOMATED COUNT: 13.1 % (ref 11.5–14.5)
GLUCOSE SERPL-MCNC: 106 MG/DL (ref 74–109)
HCT VFR BLD AUTO: 33.3 % (ref 42–52)
HGB BLD-MCNC: 11.2 G/DL (ref 14–18)
MCH RBC QN AUTO: 30.9 PG (ref 27–31)
MCHC RBC AUTO-ENTMCNC: 33.6 G/DL (ref 33–37)
MCV RBC AUTO: 92 FL (ref 80–94)
PLATELET # BLD AUTO: 223 K/UL (ref 130–400)
PMV BLD AUTO: 9.7 FL (ref 9.4–12.4)
POTASSIUM SERPL-SCNC: 3.6 MMOL/L (ref 3.5–5)
RBC # BLD AUTO: 3.62 M/UL (ref 4.7–6.1)
SODIUM SERPL-SCNC: 137 MMOL/L (ref 136–145)
WBC # BLD AUTO: 10.9 K/UL (ref 4.8–10.8)

## 2024-08-12 PROCEDURE — 2580000003 HC RX 258: Performed by: NURSE PRACTITIONER

## 2024-08-12 PROCEDURE — 85027 COMPLETE CBC AUTOMATED: CPT

## 2024-08-12 PROCEDURE — 6360000002 HC RX W HCPCS: Performed by: NURSE PRACTITIONER

## 2024-08-12 PROCEDURE — 6370000000 HC RX 637 (ALT 250 FOR IP): Performed by: NURSE PRACTITIONER

## 2024-08-12 PROCEDURE — 36415 COLL VENOUS BLD VENIPUNCTURE: CPT

## 2024-08-12 PROCEDURE — 1200000000 HC SEMI PRIVATE

## 2024-08-12 PROCEDURE — 80048 BASIC METABOLIC PNL TOTAL CA: CPT

## 2024-08-12 PROCEDURE — 76770 US EXAM ABDO BACK WALL COMP: CPT

## 2024-08-12 PROCEDURE — 94760 N-INVAS EAR/PLS OXIMETRY 1: CPT

## 2024-08-12 RX ADMIN — SODIUM CHLORIDE 1500 MG: 9 INJECTION, SOLUTION INTRAVENOUS at 13:46

## 2024-08-12 RX ADMIN — SODIUM CHLORIDE: 9 INJECTION, SOLUTION INTRAVENOUS at 11:37

## 2024-08-12 RX ADMIN — SODIUM CHLORIDE 1500 MG: 9 INJECTION, SOLUTION INTRAVENOUS at 20:11

## 2024-08-12 RX ADMIN — FINASTERIDE 5 MG: 5 TABLET, FILM COATED ORAL at 08:25

## 2024-08-12 RX ADMIN — ENOXAPARIN SODIUM 30 MG: 100 INJECTION SUBCUTANEOUS at 16:10

## 2024-08-12 RX ADMIN — TAMSULOSIN HYDROCHLORIDE 0.8 MG: 0.4 CAPSULE ORAL at 20:12

## 2024-08-12 ASSESSMENT — PAIN SCALES - GENERAL
PAINLEVEL_OUTOF10: 0

## 2024-08-12 ASSESSMENT — ENCOUNTER SYMPTOMS
RHINORRHEA: 0
COUGH: 0
BACK PAIN: 0
SHORTNESS OF BREATH: 0
COLOR CHANGE: 0
NAUSEA: 0
VOMITING: 0
ABDOMINAL PAIN: 0

## 2024-08-12 NOTE — PLAN OF CARE
Problem: Discharge Planning  Goal: Discharge to home or other facility with appropriate resources  8/12/2024 0052 by Spike Cota RN  Outcome: Progressing  8/11/2024 1825 by Mimi Lepe RN  Outcome: Progressing     Problem: ABCDS Injury Assessment  Goal: Absence of physical injury  8/12/2024 0052 by Spike Cota RN  Outcome: Progressing  8/11/2024 1825 by Mimi Lepe RN  Outcome: Progressing     Problem: Safety - Adult  Goal: Free from fall injury  8/12/2024 0052 by Spiek Cota RN  Outcome: Progressing  8/11/2024 1825 by Mimi Lepe RN  Outcome: Progressing

## 2024-08-12 NOTE — PROGRESS NOTES
[facility]    Patient:  Jairon Rose  YOB: 1945  Date of Service: 8/12/2024  MRN: [id number]   Acct: [id number]   Primary Care Physician: Segundo Phipps MD  Advance Directive: Full Code  Admit Date: 8/11/2024       Hospital Day: 1     NOTE: Portions of this note have been copied forward, however, changes made to reflect the most current clinical status of this patient.    CHIEF COMPLAINT:     Chief Complaint   Patient presents with    Dysuria     Ongoing issues with dysuria, multiple catheter insertions that have been placed by Urology; sees Sona       8/12/2024 7:25 AM  Subjective / Interval History:   08/12/2024  Patient seen and examined this a.m. No new complaints.  No acute changes or acute overnight event reported. Laying comfortably in bed in no apparent acute distress.  Denies any acute complaints or distress.     Endorses overall improvement.        Review of Systems:   Review of Systems  ROS: 14 point review of systems is negative except as specifically addressed above.    ADULT DIET; Regular; Low Fat/Low Chol/High Fiber/2 gm Na    Intake/Output Summary (Last 24 hours) at 8/12/2024 0725  Last data filed at 8/12/2024 0516  Gross per 24 hour   Intake --   Output 1950 ml   Net -1950 ml       Medications:   sodium chloride      sodium chloride 100 mL/hr at 08/11/24 1629     Current Facility-Administered Medications   Medication Dose Route Frequency Provider Last Rate Last Admin    lidocaine (XYLOCAINE) 2 % uro-jet   Topical PRN Yeny Schrader MD   Given at 08/11/24 1209    sodium chloride flush 0.9 % injection 5-40 mL  5-40 mL IntraVENous 2 times per day Warner Montejo APRN - CNP        sodium chloride flush 0.9 % injection 5-40 mL  5-40 mL IntraVENous PRN Warner Montejo APRN - CNP        0.9 % sodium chloride infusion   IntraVENous PRN Warner Montejo APRN - CNP        enoxaparin Sodium (LOVENOX) injection 30 mg  30 mg SubCUTAneous Q24H [facility]

## 2024-08-12 NOTE — CONSULTS
Palliative Care:          Visit made with Pt.  Pt is new to Palliative Care.  Pt lying in bed with eyes closed and responded to voice.  Introduced self to Pt and explained Palliative Care and he was acceptable to visit.  Pt presented to ED due to difficulty with urination after recent removal of osman that had been placed by urology.   Pt has history of recent CVA.  Pt states he is good, has no symptoms, no needs and just wants to go home.      Past Medical History:      History reviewed. No pertinent past medical history.    Advance Directives:    Not on file.  Pt stated he has had recent trust, last will and testament, and POA paperwork completed and is not interested in completing other documents.             Pain/Other Symptoms:     Pt denies.       How are symptoms affecting QOL:  Pt recently hospitalized at [facility].  Pt states he is doing good and although he is pleasant he does not engage in conversation and doesn't want to answer questions.  Pt's answers to most questions are \"I'm good\" and \"I just want to go home.\"    Psychological/Spiritual:      Pt states he and his wife have been attending [facility].                 Plan:    Pt hopes to go home tomorrow.        Patient/family discussion r/t goals:           (what does living well look like to you?    Returning home at previous level of activity stating he was able to do some chores at home and still able to get out of the house.  Pt hopes he can transition to osman leg bag so he can return to normal activities stating he had a osman leg bag previous week.            Palliative Care team will continue to follow and support, with ongoing review of pt's goals of care.                Electronically signed by Lynn Helton RN on 8/12/2024 at 1:35 PM

## 2024-08-12 NOTE — PROGRESS NOTES
Urology Progress Note        SUBJECTIVE:  No new  complaints. Resting in bed.     OBJECTIVE:   Review of Systems     Physical  VITALS:  BP (!) 130/54   Pulse 73   Temp 98.2 °F (36.8 °C)   Resp 18   Ht 1.651 m (5' 5\")   Wt 56.7 kg (125 lb)   SpO2 97%   BMI 20.80 kg/m²   TEMPERATURE:  Current - Temp: 98.2 °F (36.8 °C); Max - Temp  Av.3 °F (36.8 °C)  Min: 97.7 °F (36.5 °C)  Max: 99 °F (37.2 °C)   24 HR I&O   Intake/Output Summary (Last 24 hours) at 2024 0749  Last data filed at 2024 0516  Gross per 24 hour   Intake --   Output 1950 ml   Net -1950 ml       Physical Exam   BACK: inspection of back is normal and no tenderness in spine or flanks  ABDOMEN:  soft, non-distended, and non-tender  HEART:  normal rate  CHEST:  Normal respiratory effort   GENITAL/URINARY:  Chapman cath to bedside draining annita urine. Some hematuria noted at the bottom of the bag, no current active hematuria.     Data  CBC:   Recent Labs     24  1103 24  0138   WBC 15.6* 10.9*   HGB 12.4* 11.2*   HCT 36.1* 33.3*    223     BMP:    Recent Labs     24  1103 24  0138   * 137   K 3.9 3.6   CL 99 104   CO2 21* 20*   BUN 32* 30*   CREATININE 2.7* 1.6*   GLUCOSE 148* 106       No results for input(s): \"LABURIN\" in the last 72 hours.  No results for input(s): \"BC\" in the last 72 hours.  No results for input(s): \"BLOODCULT2\" in the last 72 hours.      U/A:    Lab Results   Component Value Date/Time    COLORU YELLOW 2024 12:17 PM    PHUR 5.5 2024 12:17 PM    PHUR 5.0 2015 09:50 AM    WBCUA 18 2024 12:17 PM    RBCUA 203 2024 12:17 PM    MUCUS 1+ 2014 08:25 PM    BACTERIA Negative 2024 12:17 PM    CLARITYU Clear 2024 12:17 PM    LEUKOCYTESUR Negative 2024 12:17 PM    UROBILINOGEN 0.2 2024 12:17 PM    BILIRUBINUR Negative 2024 12:17 PM    BLOODU LARGE 2024 12:17 PM    GLUCOSEU Negative 2024 12:17 PM     Component    Urine

## 2024-08-13 VITALS
HEIGHT: 65 IN | DIASTOLIC BLOOD PRESSURE: 44 MMHG | SYSTOLIC BLOOD PRESSURE: 96 MMHG | RESPIRATION RATE: 16 BRPM | BODY MASS INDEX: 20.83 KG/M2 | OXYGEN SATURATION: 94 % | TEMPERATURE: 97.7 F | HEART RATE: 85 BPM | WEIGHT: 125 LBS

## 2024-08-13 PROBLEM — Z51.5 PALLIATIVE CARE PATIENT: Status: ACTIVE | Noted: 2024-08-13

## 2024-08-13 LAB
ANION GAP SERPL CALCULATED.3IONS-SCNC: 12 MMOL/L (ref 7–19)
BACTERIA UR CULT: ABNORMAL
BACTERIA UR CULT: ABNORMAL
BASOPHILS # BLD: 0 K/UL (ref 0–0.2)
BASOPHILS NFR BLD: 0.3 % (ref 0–1)
BUN SERPL-MCNC: 25 MG/DL (ref 8–23)
CALCIUM SERPL-MCNC: 7.6 MG/DL (ref 8.8–10.2)
CHLORIDE SERPL-SCNC: 100 MMOL/L (ref 98–111)
CO2 SERPL-SCNC: 23 MMOL/L (ref 22–29)
CREAT SERPL-MCNC: 1.2 MG/DL (ref 0.5–1.2)
EOSINOPHIL # BLD: 0 K/UL (ref 0–0.6)
EOSINOPHIL NFR BLD: 0.2 % (ref 0–5)
ERYTHROCYTE [DISTWIDTH] IN BLOOD BY AUTOMATED COUNT: 13.1 % (ref 11.5–14.5)
GLUCOSE SERPL-MCNC: 106 MG/DL (ref 74–109)
HCT VFR BLD AUTO: 30.8 % (ref 42–52)
HGB BLD-MCNC: 10.8 G/DL (ref 14–18)
IMM GRANULOCYTES # BLD: 0.1 K/UL
LYMPHOCYTES # BLD: 1.4 K/UL (ref 1.1–4.5)
LYMPHOCYTES NFR BLD: 11.8 % (ref 20–40)
MAGNESIUM SERPL-MCNC: 1.9 MG/DL (ref 1.6–2.4)
MCH RBC QN AUTO: 32.3 PG (ref 27–31)
MCHC RBC AUTO-ENTMCNC: 35.1 G/DL (ref 33–37)
MCV RBC AUTO: 92.2 FL (ref 80–94)
MONOCYTES # BLD: 1 K/UL (ref 0–0.9)
MONOCYTES NFR BLD: 8.5 % (ref 0–10)
NEUTROPHILS # BLD: 9.4 K/UL (ref 1.5–7.5)
NEUTS SEG NFR BLD: 78.8 % (ref 50–65)
ORGANISM: ABNORMAL
PLATELET # BLD AUTO: 191 K/UL (ref 130–400)
PMV BLD AUTO: 10.3 FL (ref 9.4–12.4)
POTASSIUM SERPL-SCNC: 2.9 MMOL/L (ref 3.5–5)
POTASSIUM SERPL-SCNC: 3.4 MMOL/L (ref 3.5–5)
RBC # BLD AUTO: 3.34 M/UL (ref 4.7–6.1)
SODIUM SERPL-SCNC: 135 MMOL/L (ref 136–145)
WBC # BLD AUTO: 11.9 K/UL (ref 4.8–10.8)

## 2024-08-13 PROCEDURE — 94760 N-INVAS EAR/PLS OXIMETRY 1: CPT

## 2024-08-13 PROCEDURE — 6370000000 HC RX 637 (ALT 250 FOR IP): Performed by: NURSE PRACTITIONER

## 2024-08-13 PROCEDURE — 2580000003 HC RX 258: Performed by: NURSE PRACTITIONER

## 2024-08-13 PROCEDURE — 83735 ASSAY OF MAGNESIUM: CPT

## 2024-08-13 PROCEDURE — 6360000002 HC RX W HCPCS: Performed by: NURSE PRACTITIONER

## 2024-08-13 PROCEDURE — 84132 ASSAY OF SERUM POTASSIUM: CPT

## 2024-08-13 PROCEDURE — 36415 COLL VENOUS BLD VENIPUNCTURE: CPT

## 2024-08-13 PROCEDURE — 6370000000 HC RX 637 (ALT 250 FOR IP): Performed by: HOSPITALIST

## 2024-08-13 PROCEDURE — 80048 BASIC METABOLIC PNL TOTAL CA: CPT

## 2024-08-13 PROCEDURE — 85025 COMPLETE CBC W/AUTO DIFF WBC: CPT

## 2024-08-13 RX ORDER — POTASSIUM CHLORIDE 20 MEQ/1
40 TABLET, EXTENDED RELEASE ORAL ONCE
Status: COMPLETED | OUTPATIENT
Start: 2024-08-13 | End: 2024-08-13

## 2024-08-13 RX ORDER — FINASTERIDE 5 MG/1
5 TABLET, FILM COATED ORAL DAILY
Qty: 30 TABLET | Refills: 1 | Status: SHIPPED | OUTPATIENT
Start: 2024-08-14

## 2024-08-13 RX ORDER — POTASSIUM CHLORIDE 7.45 MG/ML
10 INJECTION INTRAVENOUS PRN
Status: DISCONTINUED | OUTPATIENT
Start: 2024-08-13 | End: 2024-08-13 | Stop reason: HOSPADM

## 2024-08-13 RX ORDER — POTASSIUM CHLORIDE 20 MEQ/1
20 TABLET, EXTENDED RELEASE ORAL DAILY
Qty: 7 TABLET | Refills: 0 | Status: SHIPPED | OUTPATIENT
Start: 2024-08-14 | End: 2024-08-21

## 2024-08-13 RX ORDER — POTASSIUM CHLORIDE 20 MEQ/1
40 TABLET, EXTENDED RELEASE ORAL PRN
Status: DISCONTINUED | OUTPATIENT
Start: 2024-08-13 | End: 2024-08-13 | Stop reason: HOSPADM

## 2024-08-13 RX ORDER — AMOXICILLIN AND CLAVULANATE POTASSIUM 875; 125 MG/1; MG/1
1 TABLET, FILM COATED ORAL 2 TIMES DAILY
Qty: 14 TABLET | Refills: 0 | Status: SHIPPED | OUTPATIENT
Start: 2024-08-13 | End: 2024-08-20

## 2024-08-13 RX ADMIN — SODIUM CHLORIDE: 9 INJECTION, SOLUTION INTRAVENOUS at 01:41

## 2024-08-13 RX ADMIN — POTASSIUM CHLORIDE 40 MEQ: 1500 TABLET, EXTENDED RELEASE ORAL at 06:40

## 2024-08-13 RX ADMIN — POTASSIUM CHLORIDE 40 MEQ: 1500 TABLET, EXTENDED RELEASE ORAL at 08:36

## 2024-08-13 RX ADMIN — SODIUM CHLORIDE, PRESERVATIVE FREE 10 ML: 5 INJECTION INTRAVENOUS at 08:37

## 2024-08-13 RX ADMIN — SODIUM CHLORIDE 1500 MG: 9 INJECTION, SOLUTION INTRAVENOUS at 04:26

## 2024-08-13 RX ADMIN — POTASSIUM CHLORIDE 40 MEQ: 1500 TABLET, EXTENDED RELEASE ORAL at 11:17

## 2024-08-13 RX ADMIN — FINASTERIDE 5 MG: 5 TABLET, FILM COATED ORAL at 08:36

## 2024-08-13 NOTE — PROGRESS NOTES
Spoke with PCP's office and spoke to Lyn advised that the office will call the patient with date and time for the appointment

## 2024-08-13 NOTE — PROGRESS NOTES
Urology Progress Note    SUBJECTIVE:  Patient resting in bed, son at BS. Patient more alert this morning, but does not always respond appropriately.    OBJECTIVE:   Crt: 1.2  Urine cx: enterococcus   Augmentin day #2.    Review of Systems     Physical  VITALS:  BP (!) 96/44   Pulse 85   Temp 97.7 °F (36.5 °C) (Oral)   Resp 16   Ht 1.651 m (5' 5\")   Wt 56.7 kg (125 lb)   SpO2 94%   BMI 20.80 kg/m²   TEMPERATURE:  Current - Temp: 97.7 °F (36.5 °C); Max - Temp  Av.5 °F (36.9 °C)  Min: 97.7 °F (36.5 °C)  Max: 99.5 °F (37.5 °C)   24 HR I&O   Intake/Output Summary (Last 24 hours) at 2024 0806  Last data filed at 2024 0713  Gross per 24 hour   Intake 480 ml   Output 2100 ml   Net -1620 ml     BACK: not done  ABDOMEN:  non-distended and non-tender  HEART:  normal rate  CHEST:  normal resp rate  GENITAL/URINARY:  FC in place with dark yellow urine    Data  CBC:   Recent Labs     24  1103 24  0138 24  0318   WBC 15.6* 10.9* 11.9*   HGB 12.4* 11.2* 10.8*   HCT 36.1* 33.3* 30.8*    223 191     BMP:    Recent Labs     24  1103 24  0138 24  0318   * 137 135*   K 3.9 3.6 2.9*   CL 99 104 100   CO2 21* 20* 23   BUN 32* 30* 25*   CREATININE 2.7* 1.6* 1.2   GLUCOSE 148* 106 106       Recent Labs     24  1217   LABURIN >100,000 CFU/ml*  Heavy growth     Recent Labs     24  1445   BC No Growth to date.  Any change in status will be called.     Recent Labs     24  1456   BLOODCULT2 No Growth to date.  Any change in status will be called.         U/A:    Lab Results   Component Value Date/Time    COLORU YELLOW 2024 12:17 PM    PHUR 5.5 2024 12:17 PM    PHUR 5.0 2015 09:50 AM    WBCUA 18 2024 12:17 PM    RBCUA 203 2024 12:17 PM    MUCUS 1+ 2014 08:25 PM    BACTERIA Negative 2024 12:17 PM    CLARITYU Clear 2024 12:17 PM    LEUKOCYTESUR Negative 2024 12:17 PM    UROBILINOGEN 0.2 2024 12:17 PM

## 2024-08-13 NOTE — DISCHARGE INSTRUCTIONS
Patient to hold documented home dose of Metoprolol until follow-up with PCP and repeat blood pressure, given low BPs

## 2024-08-13 NOTE — PLAN OF CARE
Problem: Discharge Planning  Goal: Discharge to home or other facility with appropriate resources  8/13/2024 1216 by Tona Jenkins RN  Outcome: Adequate for Discharge  8/13/2024 0158 by Spike Cota RN  Outcome: Progressing     Problem: ABCDS Injury Assessment  Goal: Absence of physical injury  8/13/2024 1216 by Tona Jenkins, RN  Outcome: Adequate for Discharge  8/13/2024 0158 by Spike Cota RN  Outcome: Progressing     Problem: Safety - Adult  Goal: Free from fall injury  8/13/2024 1216 by Tona Jenkins RN  Outcome: Adequate for Discharge  8/13/2024 0158 by Spike Cota RN  Outcome: Progressing

## 2024-08-13 NOTE — ACP (ADVANCE CARE PLANNING)
Advance Care Planning     Advance Care Planning Activator (Inpatient)  Conversation Note      Date of ACP Conversation: 8/13/2024     Conversation Conducted with: Pt with decision making abilities, and his wife, Chelsea and daughter by phone.  Pt states he does not want intubation and his wife verified family are aware and they have decision maker in place.  Pt did not want to complete LW or ACP doc.     ACP Activator: Lynn Helton RN      Health Care Decision Maker:     Current Designated Health Care Decision Maker:     Primary Decision Maker: SG DÍAZ - Child - [phone]    Secondary Decision Maker: Chelsea Díaz - Spouse - [phone]      Care Preferences    Ventilation:  \"If you were in your present state of health and suddenly became very ill and were unable to breathe on your own, what would your preference be about the use of a ventilator (breathing machine) if it were available to you?\"  No    Would the patient desire the use of ventilator (breathing machine)?: No    \"If your health worsens and it becomes clear that your chance of recovery is unlikely, what would your preference be about the use of a ventilator (breathing machine) if it were available to you?\"     Would the patient desire the use of ventilator (breathing machine)?: No      Resuscitation  \"CPR works best to restart the heart when there is a sudden event, like a heart attack, in someone who is otherwise healthy. Unfortunately, CPR does not typically restart the heart for people who have serious health conditions or who are very sick.\"    \"In the event your heart stopped as a result of an underlying serious health condition, would you want attempts to be made to restart your heart (answer \"yes\" for attempt to resuscitate) or would you prefer a natural death (answer \"no\" for do not attempt to resuscitate)?\" Yes       [] Yes   [x] No   Educated Patient / Decision Maker regarding differences between Advance Directives and portable DNR

## 2024-08-13 NOTE — DISCHARGE SUMMARY
[address] [place] [address]  [facility]    DISCHARGE SUMMARY:        Jairon Rose  :  1945  MRN:  [id number]    Admit date:  2024  Discharge date:   2024      Admitting Physician:  Korey Basurto MD    Advance Directive: Full Code    Consults Made:   IP CONSULT TO UROLOGY  PALLIATIVE CARE EVAL      Primary Care Physician:  Segundo Phipps MD    Discharge Diagnoses:  Principal Problem:    BREE (acute kidney injury) (HCC)  Active Problems:    Urinary retention    Leukocytosis  Resolved Problems:    * No resolved hospital problems. *          Pertinent Labs:  CBC with DIFF:  Recent Labs     24  1103 24  0138 248   WBC 15.6* 10.9* 11.9*   RBC 4.00* 3.62* 3.34*   HGB 12.4* 11.2* 10.8*   HCT 36.1* 33.3* 30.8*   MCV 90.3 92.0 92.2   MCH 31.0 30.9 32.3*   MCHC 34.3 33.6 35.1   RDW 12.7 13.1 13.1    223 191   MPV 10.1 9.7 10.3   NEUTOPHILPCT 87.3*  --  78.8*   LYMPHOPCT 6.1*  --  11.8*   MONOPCT 5.7  --  8.5   BASOPCT 0.1  --  0.3   NEUTROABS 13.6*  --  9.4*   LYMPHSABS 1.0*  --  1.4   MONOSABS 0.90  --  1.00*   EOSABS 0.00  --  0.00   BASOSABS 0.00  --  0.00       CMP/BMP:  Recent Labs     24  1103 24  0138 248 24  0913   * 137 135*  --    K 3.9 3.6 2.9* 3.4*   CL 99 104 100  --    CO2 21* 20* 23  --    ANIONGAP 14 13 12  --    GLUCOSE 148* 106 106  --    BUN 32* 30* 25*  --    CREATININE 2.7* 1.6* 1.2  --    LABGLOM 23* 43* 61  --    CALCIUM 9.4 8.2* 7.6*  --    BILITOT 0.8  --   --   --    ALKPHOS 114  --   --   --    ALT 24  --   --   --    AST 29  --   --   --          CRP:  No results for input(s): \"CRP\" in the last 72 hours.  Sed Rate:  No results for input(s): \"SEDRATE\" in the last 72 hours.      HgBA1c:  No components found for: \"HGBA1C\"  FLP:  No results found for: \"TRIG\", \"HDL\", \"LDLDIRECT\"  TSH:  No results found for: \"TSH\"  Troponin T: No results for input(s): \"TROPONINI\" in the last 72

## 2024-08-13 NOTE — PLAN OF CARE
Problem: Discharge Planning  Goal: Discharge to home or other facility with appropriate resources  8/13/2024 0158 by Spike Cota RN  Outcome: Progressing  8/12/2024 1550 by Mimi Lepe RN  Outcome: Progressing     Problem: ABCDS Injury Assessment  Goal: Absence of physical injury  8/13/2024 0158 by Spike Cota RN  Outcome: Progressing  8/12/2024 1550 by Mimi Lepe RN  Outcome: Progressing     Problem: Safety - Adult  Goal: Free from fall injury  8/13/2024 0158 by Spike Cota RN  Outcome: Progressing  8/12/2024 1550 by Mimi Lepe RN  Outcome: Progressing

## 2024-08-16 ENCOUNTER — TELEPHONE (OUTPATIENT)
Dept: UROLOGY | Age: 79
End: 2024-08-16

## 2024-08-16 LAB
BACTERIA BLD CULT ORG #2: NORMAL
BACTERIA BLD CULT: NORMAL

## 2024-08-16 NOTE — TELEPHONE ENCOUNTER
Patients daughter called and stated that patient was seen in ED and was admitted to hospital over the weekend. They have changed his medications and she would like to speak with nurse over why they were changed. Please contact her.

## 2024-08-26 ENCOUNTER — OFFICE VISIT (OUTPATIENT)
Dept: UROLOGY | Age: 79
End: 2024-08-26
Payer: MEDICARE

## 2024-08-26 VITALS
SYSTOLIC BLOOD PRESSURE: 143 MMHG | BODY MASS INDEX: 18.83 KG/M2 | HEIGHT: 65 IN | TEMPERATURE: 98.4 F | RESPIRATION RATE: 18 BRPM | WEIGHT: 113 LBS | DIASTOLIC BLOOD PRESSURE: 68 MMHG | HEART RATE: 68 BPM

## 2024-08-26 DIAGNOSIS — N40.1 BENIGN PROSTATIC HYPERPLASIA WITH URINARY RETENTION: ICD-10-CM

## 2024-08-26 DIAGNOSIS — R33.8 BENIGN PROSTATIC HYPERPLASIA WITH URINARY RETENTION: ICD-10-CM

## 2024-08-26 DIAGNOSIS — R33.9 URINARY RETENTION: Primary | ICD-10-CM

## 2024-08-26 DIAGNOSIS — Z87.898 HISTORY OF ELEVATED PSA: ICD-10-CM

## 2024-08-26 PROCEDURE — 1123F ACP DISCUSS/DSCN MKR DOCD: CPT | Performed by: NURSE PRACTITIONER

## 2024-08-26 PROCEDURE — G8427 DOCREV CUR MEDS BY ELIG CLIN: HCPCS | Performed by: NURSE PRACTITIONER

## 2024-08-26 PROCEDURE — 1111F DSCHRG MED/CURRENT MED MERGE: CPT | Performed by: NURSE PRACTITIONER

## 2024-08-26 PROCEDURE — 99214 OFFICE O/P EST MOD 30 MIN: CPT | Performed by: NURSE PRACTITIONER

## 2024-08-26 PROCEDURE — G8420 CALC BMI NORM PARAMETERS: HCPCS | Performed by: NURSE PRACTITIONER

## 2024-08-26 PROCEDURE — 1036F TOBACCO NON-USER: CPT | Performed by: NURSE PRACTITIONER

## 2024-08-26 RX ORDER — FINASTERIDE 5 MG/1
5 TABLET, FILM COATED ORAL DAILY
Qty: 90 TABLET | Refills: 3 | Status: SHIPPED | OUTPATIENT
Start: 2024-08-26

## 2024-08-26 NOTE — PROGRESS NOTES
Patient of AUBREE Argueta presents today for voiding trial post hospital stay. The patient denies any fever, chills or  N&V. After patient had given consent, using the catheter in place, 120cc of sterile water was installed into the bladder with no complications. Patient was able to void 50cc.     AUBREE Argueta was in office at time of procedure.     Patient was advised to drink clear fluids for the next couple hours and urinate. Patient was advised they may experience some blood in the urine and burning with urination for the next couple days. If the patient is unable to urinate or develops fever, chills, N&V or suprapubic pain, they will call office for an appt at clinic or seek medical treatment at nearest ER, PCP or Urgent Care if after hours. Patient verbalized understanding and all questions were answered.Patient advised to call the office with any questions or concerns.     Patient is to follow up as scheduled.           Discussed with patient in/out cath techniques and sterile techniques. Went over to wash hand with warm soap and water prior to catherizing. Patient consented to let me cath He prior to leaving office for demostration.I was able to drain 100cc of clear yellow urine from bladder.    I gave patient samples of cath and a 16f luja (supplies)  I went over that if patient was getting more than 300 ml each cath time He/she (caps): He would need to increase the amount of times a day to cath. I gave patient sample box of 16f luja catheters. Patient will call after patient tries catheters at home. Patient was instructed to call back to let office know preference on what catheter He/she (caps): He prefers.     I asked the patient to try and cath either first thing in AM and at bedtime after void attempts OR lunch time and bedtime after void attempts. I also gave patient bladder diaries so patient can document how much He/she (caps): He is draining after voiding attempts. Patient confirmed

## 2024-08-26 NOTE — PROGRESS NOTES
Jairon Rose is a 79 y.o., male, Established patient who presents today   Chief Complaint   Patient presents with    Follow-up     2wk PVR check     Patient presents after hospitalization on 8/11/2024 after presenting to the emergency room for urinary retention.  He was noted to have BREE with creatinine up to 2.1 from a baseline of 1 as well as leukocytosis with a left shift.  Chapman catheter was placed with 500 mL of urine output and he was admitted to the hospitalist team for observation.  He had recently failed his second voiding trial in our office on 8/9/2024 and the first on 8/2/24.    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  He was also incidentally found to have a lung tumor with malignant appearance on the CT of the chest.  It was during this initial hospitalization when he first developed urinary retention.  He is now maintained on 0.8 mg of Flomax, but is still unable to empty his bladder adequately.  Proscar was also added during his hospitalization.     Patient also with history of elevated PSAs.  He has had 2 previous negative biopsies.  He is no longer being screened with PSA.  Previously seen by Dr. Singer.  At baseline he does have a history of incomplete bladder emptying, frequency, urgency, weak stream, nocturia.      REVIEW OF SYSTEMS:  Review of Systems   Constitutional:  Negative for chills and fever.   Gastrointestinal:  Negative for abdominal distention, abdominal pain, nausea and vomiting.   Genitourinary:  Positive for difficulty urinating. Negative for dysuria, flank pain and hematuria.   Musculoskeletal:  Negative for back pain and gait problem.   Psychiatric/Behavioral:  Negative for agitation and confusion.        PHYSICAL EXAM:  BP (!) 143/68   Pulse 68   Temp 98.4 °F (36.9 °C) (Temporal)   Resp 18   Ht 1.651 m (5' 5\")   Wt 51.3 kg (113 lb)   BMI 18.80 kg/m²   Physical Exam  Vitals  reviewed and updated as needed by me.    EMR Dragon/transcription disclaimer: Much of this document is electronic transcription/translation of spoken language to printed text. The electronic translation of spoken language may be erroneous or, at times, nonsensical words or phrases may be inadvertently transcribed. Although I have reviewed the document for such errors, some may still exist.

## 2024-08-27 ASSESSMENT — ENCOUNTER SYMPTOMS
ABDOMINAL PAIN: 0
ABDOMINAL DISTENTION: 0
VOMITING: 0
NAUSEA: 0
BACK PAIN: 0

## 2024-08-29 ENCOUNTER — TELEPHONE (OUTPATIENT)
Dept: UROLOGY | Age: 79
End: 2024-08-29

## 2024-08-29 NOTE — TELEPHONE ENCOUNTER
Called patient and informed him that we have catheters available for him to  & let him know that we typically do not send out cath order until patient returns to 1 week CIC follow up and speaks with provider. Patient verbalized understanding & stated he would come to office to  samples.

## 2024-08-29 NOTE — TELEPHONE ENCOUNTER
Patient called stating he hasn't heard anything on his caths from the cath company and only has 8 caths left. Patient was told he could  samples at office to get him through and that we would have the nurse check on the status of that order. Please contact patient with information.

## 2024-09-04 ENCOUNTER — OFFICE VISIT (OUTPATIENT)
Dept: UROLOGY | Age: 79
End: 2024-09-04
Payer: MEDICARE

## 2024-09-04 VITALS — BODY MASS INDEX: 18.83 KG/M2 | HEIGHT: 65 IN | WEIGHT: 113 LBS | TEMPERATURE: 97.6 F

## 2024-09-04 DIAGNOSIS — Z87.898 HISTORY OF ELEVATED PSA: ICD-10-CM

## 2024-09-04 DIAGNOSIS — N40.1 BENIGN PROSTATIC HYPERPLASIA WITH URINARY RETENTION: ICD-10-CM

## 2024-09-04 DIAGNOSIS — R33.8 BENIGN PROSTATIC HYPERPLASIA WITH URINARY RETENTION: ICD-10-CM

## 2024-09-04 DIAGNOSIS — R33.9 URINARY RETENTION: Primary | ICD-10-CM

## 2024-09-04 PROCEDURE — G8420 CALC BMI NORM PARAMETERS: HCPCS | Performed by: NURSE PRACTITIONER

## 2024-09-04 PROCEDURE — 1123F ACP DISCUSS/DSCN MKR DOCD: CPT | Performed by: NURSE PRACTITIONER

## 2024-09-04 PROCEDURE — 1111F DSCHRG MED/CURRENT MED MERGE: CPT | Performed by: NURSE PRACTITIONER

## 2024-09-04 PROCEDURE — 1036F TOBACCO NON-USER: CPT | Performed by: NURSE PRACTITIONER

## 2024-09-04 PROCEDURE — 99214 OFFICE O/P EST MOD 30 MIN: CPT | Performed by: NURSE PRACTITIONER

## 2024-09-04 PROCEDURE — G8427 DOCREV CUR MEDS BY ELIG CLIN: HCPCS | Performed by: NURSE PRACTITIONER

## 2024-09-04 ASSESSMENT — ENCOUNTER SYMPTOMS
VOMITING: 0
NAUSEA: 0
BACK PAIN: 0
ABDOMINAL DISTENTION: 0
ABDOMINAL PAIN: 0

## 2024-09-04 NOTE — PROGRESS NOTES
Jairon Rose is a 79 y.o., male, Established patient who presents today   Chief Complaint   Patient presents with    Follow-up     I am here today for my 1 week follow up after starting to in and out cath.     Other     Patient did bring bladder diary.        Patient presents after initiation of intermittent catheterization on 8/26/2024.  He was originally seen in our emergency room on 7/25/2024 and noted to have MCA ischemic infarct and was transferred to [place] for thrombectomy.  During his hospitalization, he was also incidentally found to have a lung tumor with malignant appearance on CT.  He also developed urinary retention during this hospitalization.  He attempted voiding trial in our office on 8/9/2024, but was ultimately admitted on 8/11/2024 with BREE secondary to retention.  He was initiated on Proscar during that hospitalization.  He had already been on maximum dosage of Flomax.    After failing 2 voiding trials, patient was offered teaching for CIC.  He has been catheterizing about 3 times per day.  His highest catheterized volume was about 400 mL, his lowest was about 35 mL.  Based on these values, patient could most likely get by with catheterizing twice per day, 3 times a day intermittently.  He has had no trouble utilizing the Luzier catheter.  He does have quite a large prostate.     Patient also with history of elevated PSAs.  He has had 2 previous negative biopsies.  He is no longer being screened with PSA.  Previously seen by Dr. Singer.  At baseline he does have a history of incomplete bladder emptying, frequency, urgency, weak stream, nocturia.     REVIEW OF SYSTEMS:  Review of Systems   Constitutional:  Negative for chills and fever.   Gastrointestinal:  Negative for abdominal distention, abdominal pain, nausea and vomiting.   Genitourinary:  Positive for difficulty urinating (improving). Negative for dysuria, flank pain, frequency, hematuria and urgency.   Musculoskeletal:  Negative for

## 2024-09-28 ENCOUNTER — APPOINTMENT (OUTPATIENT)
Dept: GENERAL RADIOLOGY | Age: 79
DRG: 872 | End: 2024-09-28
Payer: OTHER GOVERNMENT

## 2024-09-28 ENCOUNTER — HOSPITAL ENCOUNTER (INPATIENT)
Age: 79
LOS: 2 days | Discharge: HOME | DRG: 872 | End: 2024-09-30
Attending: EMERGENCY MEDICINE | Admitting: HOSPITALIST
Payer: OTHER GOVERNMENT

## 2024-09-28 DIAGNOSIS — I48.91 ATRIAL FIBRILLATION WITH RVR (HCC): ICD-10-CM

## 2024-09-28 DIAGNOSIS — Z86.73 HISTORY OF CEREBROVASCULAR ACCIDENT (CVA) IN ADULTHOOD: ICD-10-CM

## 2024-09-28 DIAGNOSIS — Z79.01 ON CONTINUOUS ORAL ANTICOAGULATION: ICD-10-CM

## 2024-09-28 DIAGNOSIS — E87.6 HYPOKALEMIA: ICD-10-CM

## 2024-09-28 DIAGNOSIS — N30.00 ACUTE CYSTITIS WITHOUT HEMATURIA: Primary | ICD-10-CM

## 2024-09-28 PROBLEM — A41.9 SEPSIS (HCC): Status: ACTIVE | Noted: 2024-09-28

## 2024-09-28 PROBLEM — N39.0 UTI (URINARY TRACT INFECTION): Status: ACTIVE | Noted: 2024-09-28

## 2024-09-28 PROBLEM — N39.0 SEPSIS SECONDARY TO UTI (HCC): Status: ACTIVE | Noted: 2024-09-28

## 2024-09-28 LAB
ALBUMIN SERPL-MCNC: 3.9 G/DL (ref 3.5–5.2)
ALP SERPL-CCNC: 91 U/L (ref 40–129)
ALT SERPL-CCNC: 31 U/L (ref 5–41)
ANION GAP SERPL CALCULATED.3IONS-SCNC: 14 MMOL/L (ref 7–19)
AST SERPL-CCNC: 29 U/L (ref 5–40)
BACTERIA #/AREA URNS HPF: ABNORMAL /HPF
BASOPHILS # BLD: 0 K/UL (ref 0–0.2)
BASOPHILS NFR BLD: 0.1 % (ref 0–1)
BILIRUB SERPL-MCNC: 1.1 MG/DL (ref 0.2–1.2)
BILIRUB UR QL STRIP: NEGATIVE
BNP BLD-MCNC: 1037 PG/ML (ref 0–449)
BUN SERPL-MCNC: 21 MG/DL (ref 8–23)
CALCIUM SERPL-MCNC: 8.8 MG/DL (ref 8.8–10.2)
CHLORIDE SERPL-SCNC: 100 MMOL/L (ref 98–111)
CLARITY UR: ABNORMAL
CO2 SERPL-SCNC: 23 MMOL/L (ref 22–29)
COLOR UR: YELLOW
CREAT SERPL-MCNC: 1.1 MG/DL (ref 0.7–1.2)
CRYSTALS URNS MICRO: ABNORMAL /HPF
EOSINOPHIL # BLD: 0 K/UL (ref 0–0.6)
EOSINOPHIL NFR BLD: 0 % (ref 0–5)
ERYTHROCYTE [DISTWIDTH] IN BLOOD BY AUTOMATED COUNT: 14.1 % (ref 11.5–14.5)
GLUCOSE SERPL-MCNC: 134 MG/DL (ref 70–99)
GLUCOSE UR STRIP.AUTO-MCNC: NEGATIVE MG/DL
HCT VFR BLD AUTO: 35.4 % (ref 42–52)
HGB BLD-MCNC: 11.5 G/DL (ref 14–18)
HGB UR STRIP.AUTO-MCNC: ABNORMAL MG/L
IMM GRANULOCYTES # BLD: 0 K/UL
INR PPP: 1.36 (ref 0.88–1.18)
KETONES UR STRIP.AUTO-MCNC: ABNORMAL MG/DL
LACTATE BLDV-SCNC: 1.9 MG/DL (ref 0.5–1.9)
LACTATE BLDV-SCNC: 2.5 MG/DL (ref 0.5–1.9)
LEUKOCYTE ESTERASE UR QL STRIP.AUTO: ABNORMAL
LIPASE SERPL-CCNC: 52 U/L (ref 13–60)
LYMPHOCYTES # BLD: 0.2 K/UL (ref 1.1–4.5)
LYMPHOCYTES NFR BLD: 2.6 % (ref 20–40)
MAGNESIUM SERPL-MCNC: 1.6 MG/DL (ref 1.6–2.4)
MCH RBC QN AUTO: 30.2 PG (ref 27–31)
MCHC RBC AUTO-ENTMCNC: 32.5 G/DL (ref 33–37)
MCV RBC AUTO: 92.9 FL (ref 80–94)
MONOCYTES # BLD: 0 K/UL (ref 0–0.9)
MONOCYTES NFR BLD: 0.4 % (ref 0–10)
MUCOUS THREADS URNS QL MICRO: ABNORMAL /LPF
NEUTROPHILS # BLD: 7.7 K/UL (ref 1.5–7.5)
NEUTS SEG NFR BLD: 96.5 % (ref 50–65)
NITRITE UR QL STRIP.AUTO: POSITIVE
PH UR STRIP.AUTO: 5 [PH] (ref 5–8)
PLATELET # BLD AUTO: 173 K/UL (ref 130–400)
PMV BLD AUTO: 10.1 FL (ref 9.4–12.4)
POTASSIUM SERPL-SCNC: 2.6 MMOL/L (ref 3.5–5)
PROCALCITONIN: 4.66 NG/ML (ref 0–0.09)
PROT SERPL-MCNC: 6.6 G/DL (ref 6.4–8.3)
PROT UR STRIP.AUTO-MCNC: NEGATIVE MG/DL
PROTHROMBIN TIME: 16.4 SEC (ref 12–14.6)
RBC # BLD AUTO: 3.81 M/UL (ref 4.7–6.1)
RBC #/AREA URNS HPF: ABNORMAL /HPF (ref 0–2)
SODIUM SERPL-SCNC: 137 MMOL/L (ref 136–145)
SP GR UR STRIP.AUTO: 1.02 (ref 1–1.03)
UROBILINOGEN UR STRIP.AUTO-MCNC: 0.2 E.U./DL
WBC # BLD AUTO: 8 K/UL (ref 4.8–10.8)
WBC #/AREA URNS HPF: ABNORMAL /HPF (ref 0–5)

## 2024-09-28 PROCEDURE — 51798 US URINE CAPACITY MEASURE: CPT

## 2024-09-28 PROCEDURE — 83605 ASSAY OF LACTIC ACID: CPT

## 2024-09-28 PROCEDURE — 6370000000 HC RX 637 (ALT 250 FOR IP): Performed by: EMERGENCY MEDICINE

## 2024-09-28 PROCEDURE — 93005 ELECTROCARDIOGRAM TRACING: CPT | Performed by: EMERGENCY MEDICINE

## 2024-09-28 PROCEDURE — 84145 PROCALCITONIN (PCT): CPT

## 2024-09-28 PROCEDURE — 36415 COLL VENOUS BLD VENIPUNCTURE: CPT

## 2024-09-28 PROCEDURE — 1200000000 HC SEMI PRIVATE

## 2024-09-28 PROCEDURE — 6360000002 HC RX W HCPCS: Performed by: EMERGENCY MEDICINE

## 2024-09-28 PROCEDURE — 99285 EMERGENCY DEPT VISIT HI MDM: CPT

## 2024-09-28 PROCEDURE — 85610 PROTHROMBIN TIME: CPT

## 2024-09-28 PROCEDURE — 83880 ASSAY OF NATRIURETIC PEPTIDE: CPT

## 2024-09-28 PROCEDURE — 87077 CULTURE AEROBIC IDENTIFY: CPT

## 2024-09-28 PROCEDURE — 80053 COMPREHEN METABOLIC PANEL: CPT

## 2024-09-28 PROCEDURE — 83690 ASSAY OF LIPASE: CPT

## 2024-09-28 PROCEDURE — 87040 BLOOD CULTURE FOR BACTERIA: CPT

## 2024-09-28 PROCEDURE — 87086 URINE CULTURE/COLONY COUNT: CPT

## 2024-09-28 PROCEDURE — 81001 URINALYSIS AUTO W/SCOPE: CPT

## 2024-09-28 PROCEDURE — 87186 SC STD MICRODIL/AGAR DIL: CPT

## 2024-09-28 PROCEDURE — 83735 ASSAY OF MAGNESIUM: CPT

## 2024-09-28 PROCEDURE — 96374 THER/PROPH/DIAG INJ IV PUSH: CPT

## 2024-09-28 PROCEDURE — 2580000003 HC RX 258: Performed by: HOSPITALIST

## 2024-09-28 PROCEDURE — 85025 COMPLETE CBC W/AUTO DIFF WBC: CPT

## 2024-09-28 PROCEDURE — 71045 X-RAY EXAM CHEST 1 VIEW: CPT

## 2024-09-28 PROCEDURE — 2580000003 HC RX 258: Performed by: EMERGENCY MEDICINE

## 2024-09-28 RX ORDER — METOPROLOL TARTRATE 25 MG/1
25 TABLET, FILM COATED ORAL 2 TIMES DAILY
Status: DISCONTINUED | OUTPATIENT
Start: 2024-09-28 | End: 2024-09-30 | Stop reason: HOSPADM

## 2024-09-28 RX ORDER — POTASSIUM CHLORIDE 7.45 MG/ML
10 INJECTION INTRAVENOUS
Status: COMPLETED | OUTPATIENT
Start: 2024-09-28 | End: 2024-09-28

## 2024-09-28 RX ORDER — SODIUM CHLORIDE 0.9 % (FLUSH) 0.9 %
5-40 SYRINGE (ML) INJECTION PRN
Status: DISCONTINUED | OUTPATIENT
Start: 2024-09-28 | End: 2024-09-30 | Stop reason: HOSPADM

## 2024-09-28 RX ORDER — POTASSIUM CHLORIDE 1500 MG/1
40 TABLET, EXTENDED RELEASE ORAL ONCE
Status: COMPLETED | OUTPATIENT
Start: 2024-09-29 | End: 2024-09-29

## 2024-09-28 RX ORDER — SODIUM CHLORIDE 9 MG/ML
INJECTION, SOLUTION INTRAVENOUS PRN
Status: DISCONTINUED | OUTPATIENT
Start: 2024-09-28 | End: 2024-09-30 | Stop reason: HOSPADM

## 2024-09-28 RX ORDER — ATORVASTATIN CALCIUM 80 MG/1
80 TABLET, FILM COATED ORAL NIGHTLY
Status: DISCONTINUED | OUTPATIENT
Start: 2024-09-29 | End: 2024-09-30 | Stop reason: HOSPADM

## 2024-09-28 RX ORDER — ACETAMINOPHEN 325 MG/1
650 TABLET ORAL EVERY 4 HOURS PRN
Status: DISCONTINUED | OUTPATIENT
Start: 2024-09-28 | End: 2024-09-30 | Stop reason: HOSPADM

## 2024-09-28 RX ORDER — SODIUM CHLORIDE 9 MG/ML
INJECTION, SOLUTION INTRAVENOUS CONTINUOUS
Status: DISCONTINUED | OUTPATIENT
Start: 2024-09-28 | End: 2024-09-30 | Stop reason: HOSPADM

## 2024-09-28 RX ORDER — POTASSIUM CHLORIDE 1500 MG/1
40 TABLET, EXTENDED RELEASE ORAL ONCE
Status: COMPLETED | OUTPATIENT
Start: 2024-09-28 | End: 2024-09-28

## 2024-09-28 RX ORDER — POLYETHYLENE GLYCOL 3350 17 G/17G
17 POWDER, FOR SOLUTION ORAL DAILY PRN
Status: DISCONTINUED | OUTPATIENT
Start: 2024-09-28 | End: 2024-09-30 | Stop reason: HOSPADM

## 2024-09-28 RX ORDER — SODIUM CHLORIDE 0.9 % (FLUSH) 0.9 %
5-40 SYRINGE (ML) INJECTION EVERY 12 HOURS SCHEDULED
Status: DISCONTINUED | OUTPATIENT
Start: 2024-09-28 | End: 2024-09-30 | Stop reason: HOSPADM

## 2024-09-28 RX ORDER — ACETAMINOPHEN 650 MG/1
650 SUPPOSITORY RECTAL EVERY 4 HOURS PRN
Status: DISCONTINUED | OUTPATIENT
Start: 2024-09-28 | End: 2024-09-30 | Stop reason: HOSPADM

## 2024-09-28 RX ORDER — CALCIUM CARBONATE 500 MG/1
500 TABLET, CHEWABLE ORAL 3 TIMES DAILY PRN
Status: DISCONTINUED | OUTPATIENT
Start: 2024-09-28 | End: 2024-09-30 | Stop reason: HOSPADM

## 2024-09-28 RX ORDER — MECOBALAMIN 5000 MCG
5 TABLET,DISINTEGRATING ORAL NIGHTLY PRN
Status: DISCONTINUED | OUTPATIENT
Start: 2024-09-29 | End: 2024-09-30 | Stop reason: HOSPADM

## 2024-09-28 RX ORDER — LANOLIN ALCOHOL/MO/W.PET/CERES
400 CREAM (GRAM) TOPICAL DAILY
Status: DISCONTINUED | OUTPATIENT
Start: 2024-09-29 | End: 2024-09-30 | Stop reason: HOSPADM

## 2024-09-28 RX ORDER — FINASTERIDE 5 MG/1
5 TABLET, FILM COATED ORAL DAILY
Status: DISCONTINUED | OUTPATIENT
Start: 2024-09-29 | End: 2024-09-30 | Stop reason: HOSPADM

## 2024-09-28 RX ORDER — TAMSULOSIN HYDROCHLORIDE 0.4 MG/1
0.8 CAPSULE ORAL NIGHTLY
Status: DISCONTINUED | OUTPATIENT
Start: 2024-09-29 | End: 2024-09-30 | Stop reason: HOSPADM

## 2024-09-28 RX ORDER — SODIUM CHLORIDE, SODIUM LACTATE, POTASSIUM CHLORIDE, AND CALCIUM CHLORIDE .6; .31; .03; .02 G/100ML; G/100ML; G/100ML; G/100ML
30 INJECTION, SOLUTION INTRAVENOUS ONCE
Status: COMPLETED | OUTPATIENT
Start: 2024-09-28 | End: 2024-09-28

## 2024-09-28 RX ADMIN — POTASSIUM CHLORIDE 40 MEQ: 1500 TABLET, EXTENDED RELEASE ORAL at 21:58

## 2024-09-28 RX ADMIN — SODIUM CHLORIDE: 9 INJECTION, SOLUTION INTRAVENOUS at 23:21

## 2024-09-28 RX ADMIN — SODIUM CHLORIDE, POTASSIUM CHLORIDE, SODIUM LACTATE AND CALCIUM CHLORIDE 1556 ML: 600; 310; 30; 20 INJECTION, SOLUTION INTRAVENOUS at 21:09

## 2024-09-28 RX ADMIN — POTASSIUM CHLORIDE 10 MEQ: 7.46 INJECTION, SOLUTION INTRAVENOUS at 23:02

## 2024-09-28 RX ADMIN — POTASSIUM CHLORIDE 10 MEQ: 7.46 INJECTION, SOLUTION INTRAVENOUS at 22:02

## 2024-09-28 RX ADMIN — WATER 1000 MG: 1 INJECTION INTRAMUSCULAR; INTRAVENOUS; SUBCUTANEOUS at 21:10

## 2024-09-29 PROBLEM — R91.1 PULMONARY NODULE, RIGHT: Status: ACTIVE | Noted: 2024-09-29

## 2024-09-29 LAB
ANION GAP SERPL CALCULATED.3IONS-SCNC: 11 MMOL/L (ref 7–19)
BASOPHILS # BLD: 0 K/UL (ref 0–0.2)
BASOPHILS NFR BLD: 0.1 % (ref 0–1)
BUN SERPL-MCNC: 22 MG/DL (ref 8–23)
CALCIUM SERPL-MCNC: 7.9 MG/DL (ref 8.8–10.2)
CHLORIDE SERPL-SCNC: 104 MMOL/L (ref 98–111)
CO2 SERPL-SCNC: 23 MMOL/L (ref 22–29)
CREAT SERPL-MCNC: 1 MG/DL (ref 0.7–1.2)
EKG P AXIS: NORMAL DEGREES
EKG P-R INTERVAL: NORMAL MS
EKG Q-T INTERVAL: 328 MS
EKG QRS DURATION: 92 MS
EKG QTC CALCULATION (BAZETT): 412 MS
EKG T AXIS: 61 DEGREES
EOSINOPHIL # BLD: 0 K/UL (ref 0–0.6)
EOSINOPHIL NFR BLD: 0 % (ref 0–5)
ERYTHROCYTE [DISTWIDTH] IN BLOOD BY AUTOMATED COUNT: 14.3 % (ref 11.5–14.5)
GLUCOSE SERPL-MCNC: 128 MG/DL (ref 70–99)
HCT VFR BLD AUTO: 28.7 % (ref 42–52)
HGB BLD-MCNC: 9.5 G/DL (ref 14–18)
IMM GRANULOCYTES # BLD: 0.1 K/UL
LACTATE BLDV-SCNC: 1 MMOL/L (ref 0.5–1.9)
LACTATE BLDV-SCNC: 1.3 MMOL/L (ref 0.5–1.9)
LYMPHOCYTES # BLD: 0.3 K/UL (ref 1.1–4.5)
LYMPHOCYTES NFR BLD: 2 % (ref 20–40)
MAGNESIUM SERPL-MCNC: 1.5 MG/DL (ref 1.6–2.4)
MAGNESIUM SERPL-MCNC: 2.3 MG/DL (ref 1.6–2.4)
MCH RBC QN AUTO: 31.3 PG (ref 27–31)
MCHC RBC AUTO-ENTMCNC: 33.1 G/DL (ref 33–37)
MCV RBC AUTO: 94.4 FL (ref 80–94)
MONOCYTES # BLD: 0.3 K/UL (ref 0–0.9)
MONOCYTES NFR BLD: 2.3 % (ref 0–10)
NEUTROPHILS # BLD: 13.1 K/UL (ref 1.5–7.5)
NEUTS SEG NFR BLD: 94.9 % (ref 50–65)
PLATELET # BLD AUTO: 151 K/UL (ref 130–400)
PMV BLD AUTO: 10.5 FL (ref 9.4–12.4)
POTASSIUM SERPL-SCNC: 3.5 MMOL/L (ref 3.5–5)
RBC # BLD AUTO: 3.04 M/UL (ref 4.7–6.1)
SODIUM SERPL-SCNC: 138 MMOL/L (ref 136–145)
WBC # BLD AUTO: 13.8 K/UL (ref 4.8–10.8)

## 2024-09-29 PROCEDURE — 93010 ELECTROCARDIOGRAM REPORT: CPT | Performed by: INTERNAL MEDICINE

## 2024-09-29 PROCEDURE — 85025 COMPLETE CBC W/AUTO DIFF WBC: CPT

## 2024-09-29 PROCEDURE — 97161 PT EVAL LOW COMPLEX 20 MIN: CPT

## 2024-09-29 PROCEDURE — 6360000002 HC RX W HCPCS: Performed by: HOSPITALIST

## 2024-09-29 PROCEDURE — 6360000002 HC RX W HCPCS: Performed by: STUDENT IN AN ORGANIZED HEALTH CARE EDUCATION/TRAINING PROGRAM

## 2024-09-29 PROCEDURE — 2580000003 HC RX 258: Performed by: HOSPITALIST

## 2024-09-29 PROCEDURE — 6370000000 HC RX 637 (ALT 250 FOR IP): Performed by: HOSPITALIST

## 2024-09-29 PROCEDURE — 1200000000 HC SEMI PRIVATE

## 2024-09-29 PROCEDURE — 2580000003 HC RX 258: Performed by: STUDENT IN AN ORGANIZED HEALTH CARE EDUCATION/TRAINING PROGRAM

## 2024-09-29 PROCEDURE — 6370000000 HC RX 637 (ALT 250 FOR IP): Performed by: STUDENT IN AN ORGANIZED HEALTH CARE EDUCATION/TRAINING PROGRAM

## 2024-09-29 PROCEDURE — 2580000003 HC RX 258: Performed by: EMERGENCY MEDICINE

## 2024-09-29 PROCEDURE — 83605 ASSAY OF LACTIC ACID: CPT

## 2024-09-29 PROCEDURE — 80048 BASIC METABOLIC PNL TOTAL CA: CPT

## 2024-09-29 PROCEDURE — 83735 ASSAY OF MAGNESIUM: CPT

## 2024-09-29 PROCEDURE — 36415 COLL VENOUS BLD VENIPUNCTURE: CPT

## 2024-09-29 RX ORDER — MAGNESIUM SULFATE IN WATER 40 MG/ML
2000 INJECTION, SOLUTION INTRAVENOUS ONCE
Status: COMPLETED | OUTPATIENT
Start: 2024-09-29 | End: 2024-09-29

## 2024-09-29 RX ORDER — 0.9 % SODIUM CHLORIDE 0.9 %
500 INTRAVENOUS SOLUTION INTRAVENOUS ONCE
Status: COMPLETED | OUTPATIENT
Start: 2024-09-29 | End: 2024-09-29

## 2024-09-29 RX ORDER — MAGNESIUM SULFATE IN WATER 40 MG/ML
2000 INJECTION, SOLUTION INTRAVENOUS PRN
Status: DISCONTINUED | OUTPATIENT
Start: 2024-09-29 | End: 2024-09-30 | Stop reason: HOSPADM

## 2024-09-29 RX ADMIN — METOPROLOL TARTRATE 25 MG: 25 TABLET, FILM COATED ORAL at 08:46

## 2024-09-29 RX ADMIN — WATER 1000 MG: 1 INJECTION INTRAMUSCULAR; INTRAVENOUS; SUBCUTANEOUS at 20:10

## 2024-09-29 RX ADMIN — Medication 400 MG: at 08:46

## 2024-09-29 RX ADMIN — SODIUM CHLORIDE, PRESERVATIVE FREE 10 ML: 5 INJECTION INTRAVENOUS at 08:46

## 2024-09-29 RX ADMIN — APIXABAN 2.5 MG: 2.5 TABLET, FILM COATED ORAL at 08:46

## 2024-09-29 RX ADMIN — SODIUM CHLORIDE 500 ML: 9 INJECTION, SOLUTION INTRAVENOUS at 08:46

## 2024-09-29 RX ADMIN — APIXABAN 2.5 MG: 2.5 TABLET, FILM COATED ORAL at 20:09

## 2024-09-29 RX ADMIN — TAMSULOSIN HYDROCHLORIDE 0.8 MG: 0.4 CAPSULE ORAL at 20:09

## 2024-09-29 RX ADMIN — MAGNESIUM SULFATE HEPTAHYDRATE 2000 MG: 40 INJECTION, SOLUTION INTRAVENOUS at 03:47

## 2024-09-29 RX ADMIN — METOPROLOL TARTRATE 25 MG: 25 TABLET, FILM COATED ORAL at 20:09

## 2024-09-29 RX ADMIN — ATORVASTATIN CALCIUM 80 MG: 80 TABLET, FILM COATED ORAL at 20:09

## 2024-09-29 RX ADMIN — FINASTERIDE 5 MG: 5 TABLET, FILM COATED ORAL at 08:45

## 2024-09-29 RX ADMIN — POTASSIUM BICARBONATE 40 MEQ: 782 TABLET, EFFERVESCENT ORAL at 08:45

## 2024-09-29 RX ADMIN — SODIUM CHLORIDE 500 ML: 9 INJECTION, SOLUTION INTRAVENOUS at 00:22

## 2024-09-29 RX ADMIN — POTASSIUM CHLORIDE 40 MEQ: 1500 TABLET, EXTENDED RELEASE ORAL at 00:26

## 2024-09-29 NOTE — PLAN OF CARE
SUBJECTIVE:    EP:   Patient presented with hypotension, this was seen at home on BP checks and has been seen here, has been responding well to fluid bolus. Has a history of UTI, and UTI suspected to be cause of hypotension (UA results confirmed ED doc's suspicion.) Also noted to have hypokalemia with borderline magnesium. Has a Hx of A Fib on Metoprolol and Apixaban.      OBJECTIVE:    BP (!) 90/54   Pulse (!) 133   Temp 99 °F (37.2 °C)   Resp 16   Wt 52.2 kg (115 lb)   SpO2 96%   BMI 19.14 kg/m²       ASSESSMENTS & PLANS:    Fluid Responsive Septic Shock secondary to UTI:  Admit to medical wards  IVF at 95cc/h NS for maintenance  Got LR Bolus of 1566cc in ED as per Sepsis guidelines  F/U Urine Cx and Blood Cxx from ED  LA at 1.9    Hypokalemia: 2.6 PoA  KDUR 40meq x once in ED, will repeat at just after 2 h amarjit and get a repeat K level a hour after  Also got a run of 10meq Kcl in ED  A common cause of hypokalemia is hypomagnesemia, the patient has a Magnesium of 1.6 which is at the lowest end of normal, will provide Magnesium Oxide 400mg PO Qday while inpatient as support for this     Chronic Medical Problems:  Continue home regimen as indicated    Supportive and Prophylactic Txx:  DVT PPx: on Apixban BID as per home (will adjust dose as they are on 5mg but should be at 2.5mg as per dosing guideline from [facility])  GI (PUD) PPx: not indicated  PT: indicated as per age and admitted status      Case d/w EP in detail  Chart reviewed   Orders entered by me with CPOE  Case d/w NP swing shift hospitlaist

## 2024-09-29 NOTE — H&P
[facility]      Hospitalist - History & Physical      PCP: Segundo Phipps MD    Date of Admission: 9/28/2024    Date of Service: 9/28/2024    Chief Complaint: Fever and chills    History Of Present Illness:   The patient is a 79 y.o. male who presented to [facility] ED for evaluation of fever and chills. Pt has history of BPH with urination retention, stroke, hypertension and intermittent atrial fibrillation on eliquis.     Pt reports problems with episodes of subjective fevers and chills last night associate with malaise, nausea and vomiting. Pt was noted by family to have elevated heart rate and low blood pressure at home presenting for further evaluation. Pt denies problems with shortness of breath, abdominal pain as well as current problems with emptying bladder having self cath requirement last 2 weeks ago.    In ED, pcoaclcitonin 4.66, 30cc/kg sepsis fluid bolus given, rocephin 1g iv, potassium 2.6, creatinine 1.1, bun 21, glucose 134, ua with positive urine nitrie and large leukocyte esterase, EKG with afib hr 111b/min, sinus rhythm currently on bedside monitor. Pt is admitted inpatient to hospitalist.    Past Medical History:        Diagnosis Date    Palliative care patient 08/13/2024       Past Surgical History:    History reviewed. No pertinent surgical history.    Home Medications:  Prior to Admission medications    Medication Sig Start Date End Date Taking? Authorizing Provider   finasteride (PROSCAR) 5 MG tablet Take 1 tablet by mouth daily 8/26/24   Sofia Fernando APRN - CNP   potassium chloride (KLOR-CON M) 20 MEQ extended release tablet Take 1 tablet by mouth daily for 7 days  Patient not taking: Reported on 8/26/2024 8/14/24 8/21/24  Korey Basurto MD   Apixaban (ELIQUIS PO) Take 5 mg by mouth in the morning and at bedtime    ProviderFrandy MD   tamsulosin (FLOMAX) 0.4 MG capsule Take 2 capsules by mouth at bedtime 8/9/24   Sofia Fernando APRN - CNP   atorvastatin  Panel:   Recent Labs     09/28/24  2100   INR 1.36*   PROTIME 16.4*     XR CHEST PORTABLE    Result Date: 9/28/2024  EXAMINATION: AP CHEST RADIOGRAPH.  HISTORY: Sepsis  COMPARISON: 07/25/2024 chest x-ray, 07/25/2024 CTA neck  FINDINGS:  Right apical nodule again seen.  There is a right mid lung calcified granuloma.No focal consolidation, pleural effusion or pneumothorax is identified.  The cardiomediastinal silhouette is within normal limits. Aortic calcified plaque.      Right apical nodule again seen concerning for neoplasm.  Recommend PET-CT or biopsy.  No acute cardiopulmonary findings.    ______________________________________ Electronically signed by: BAMBI MORRIS M.D. Date:     09/28/2024 Time:    21:25       Assessment/Plan:  Principal Problem:    Sepsis (HCC)  Active Problems:    Hypokalemia    UTI (urinary tract infection)  Resolved Problems:    * No resolved hospital problems. *     Principal Problem:    Sepsis w/UTI (urinary tract infection)  -rocephin 1g iv daily  -follow pan cx  -ns at 95cc/hr  -lactic acid q2hrs  -goal map >65  -pressors as warranted  -ensure adequate iv access  -I's and O's  -daily weight  -npo  -bedside swallow assessment  -adat  -consult PT  -procalcitonin in am  -hypoglycemia orders    Hypokalemia  -replete  -follow lytes  Resolved Problems:    * No resolved hospital problems. *  Signed:  AUBREE Gonzales - CNP, 9/28/2024 10:18 PM

## 2024-09-29 NOTE — PROGRESS NOTES
[facility] EVALUATION      Jairon Rose    : 1945  MRN: [id number]   PHYSICIAN:  Bertrand Cardenas MD  Primary Problem    Patient Active Problem List   Diagnosis    BREE (acute kidney injury) ([facility])    Urinary retention    Leukocytosis    Palliative care patient    Sepsis secondary to UTI ([facility])    Hypokalemia    UTI (urinary tract infection)    Pulmonary nodule, right       Rehabilitation Diagnosis:     Hypokalemia [E87.6]  Atrial fibrillation with RVR ([facility]) [I48.91]  Acute cystitis without hematuria [N30.00]  Sepsis (HCC) [A41.9]  On continuous oral anticoagulation [Z79.01]  History of cerebrovascular accident (CVA) in adulthood [Z86.73]       SERVICE DATE: 2024        SUBJECTIVE: Patient agrees that they are safe with mobility and do not require physical therapy services. Willing to walk in [place], hoping for home soon.     Pain: No complaint of pain    OBJECTIVE:    Orientation is Within normal limits           ACTIVE ROM:       All major lower extremity joints are within functional limits      STRENGTH     Both lower extremities are grossly within functional limits.    TRANSFERS   Sit to stand   SB-IND     Bed to chair   SB-IND    Bed to mobility   Supine to sit   Independent       Roll     Independent     Scoot Side to side / Up and down   Independent    AMBULATION   Distance: 200'     Device: NONE- pushed IV pole     Assistance: SB-IND       Comment: good pace, no LOB, no dyspnea    BALANCE   Sitting    Good     Standing    Good    ASSESSMENT      Independent and safe with all functional mobility. No skilled physical therapy needs identified at this time.        PLAN: Discharge from skilled physical therapy. Nursing has been updated.      GOALS   Independent and safe with all functional mobility (MET)            Electronically signed by Stefani Mckeon PT on 2024 at 3:42 PM

## 2024-09-29 NOTE — PROGRESS NOTES
[facility]      Progress Note    Patient:  Jairon Rose  YOB: 1945  Date of Service: 9/29/2024  MRN: [id number]   Acct: [id number]   Primary Care Physician: Segundo Phipps MD  Advance Directive: Full Code  Admit Date: 9/28/2024       Hospital Day: 1    Portions of this note have been copied forward, however, updated to reflect the most current clinical status of this patient.     CHIEF COMPLAINT Fever and chills    SUBJECTIVE:  Feeling better .  Would like to go home      CUMULATIVE HOSPITAL COURSE:    The patient is a 79 y.o. male who presented to [facility] ED for evaluation of fever and chills. Pt has history of BPH with urination retention, stroke, hypertension and intermittent atrial fibrillation on eliquis.      Pt reports problems with episodes of subjective fevers and chills last night associate with malaise, nausea and vomiting. Pt was noted by family to have elevated heart rate and low blood pressure at home presenting for further evaluation. Pt denies problems with shortness of breath, abdominal pain as well as current problems with emptying bladder having self cath requirement last 2 weeks ago.     In ED, pcoaclcitonin 4.66, 30cc/kg sepsis fluid bolus given, rocephin 1g iv, potassium 2.6, creatinine 1.1, bun 21, glucose 134, ua with positive urine nitrie and large leukocyte esterase, EKG with afib hr 111b/min, sinus rhythm currently on bedside monitor. Pt is admitted inpatient to hospitalist.  9/29/2024-Pt feeling much better.  Urine culture pending.  Afebrile    Objective:   VITALS:  BP (!) 98/44 Comment: manual  Pulse 78   Temp 98.2 °F (36.8 °C) (Temporal)   Resp 16   Ht 1.651 m (5' 5\")   Wt 52.2 kg (115 lb)   SpO2 97%   BMI 19.14 kg/m²   24HR INTAKE/OUTPUT:  No intake or output data in the 24 hours ending 09/29/24 1659        Physical Exam  Vitals and nursing note reviewed.   Constitutional:       Appearance: Normal appearance.   HENT:      Head: Normocephalic and  hours.  Pro-BNP: No results for input(s): \"BNP\" in the last 72 hours.  INR:   Recent Labs     09/28/24  2100   INR 1.36*     UA:  Recent Labs     09/28/24  2152   COLORU YELLOW   PHUR 5.0   WBCUA 21-30*   RBCUA 16-20*   MUCUS 2+*   BACTERIA 4+   CLARITYU CLOUDY*   LEUKOCYTESUR LARGE*   UROBILINOGEN 0.2   BILIRUBINUR Negative   BLOODU LARGE*   GLUCOSEU Negative     A1C: No results for input(s): \"LABA1C\" in the last 72 hours.  ABG:No results for input(s): \"PHART\", \"MKK9IZB\", \"PO2ART\", \"PBB2LYP\", \"BEART\", \"HGBAE\", \"S6QDYZAA\", \"CARBOXHGBART\" in the last 72 hours.    RAD:   XR CHEST PORTABLE    Result Date: 9/28/2024  EXAMINATION: AP CHEST RADIOGRAPH.  HISTORY: Sepsis  COMPARISON: 07/25/2024 chest x-ray, 07/25/2024 CTA neck  FINDINGS:  Right apical nodule again seen.  There is a right mid lung calcified granuloma.No focal consolidation, pleural effusion or pneumothorax is identified.  The cardiomediastinal silhouette is within normal limits. Aortic calcified plaque.      Right apical nodule again seen concerning for neoplasm.  Recommend PET-CT or biopsy.  No acute cardiopulmonary findings.    ______________________________________ Electronically signed by: BAMBI MORRIS M.D. Date:     09/28/2024 Time:    21:25               Assessment/Plan   Principal Problem:    Sepsis secondary to UTI (HCC)  Active Problems:    Leukocytosis    Palliative care patient    Hypokalemia    UTI (urinary tract infection)    Pulmonary nodule, right  Resolved Problems:    * No resolved hospital problems. *    Principal Problem:    Sepsis secondary to UTI (HCC)   Rocephin 1 g every 24   Strict intake and output   Culture pending   Monitor fever curve   Sepsis protocol followed with bolus    Repeat lactic normal  Active Problems:    Leukocytosis       Palliative care patient   Consult   Note patient has a nodule in the right upper lobe that PET scan is encouraged.  Patient is aware and chooses not to pursue diagnostic workup on that area per his

## 2024-09-29 NOTE — ED NOTES
ED TO INPATIENT SBAR HANDOFF    Patient Name: Jairon Rose   : 1945  79 y.o.   Family/Caregiver Present: Yes  Code Status Order: Prior    C-SSRS: Risk of Suicide: No Risk  Sitter No  Restraints:         Situation  Chief Complaint:   Chief Complaint   Patient presents with    Hypotension     Family checked BP at home and it was 101/50 and , brought him in for concern of abnormal vitals; 122/58 in triage     Patient Diagnosis: Sepsis (HCC) [A41.9]     Brief Description of Patient's Condition: Jairon Rose is a 79 y.o. male who presents to the emergency department for evaluation after he has been \"out of it\" throughout the day according to family.  Started having chills last night and then low-grade fever today per family.  They checked his blood pressure and noticed that it was low and that his heart rate was persistently 120s to 130s.  Patient denies any specific symptoms other than some nausea and vomiting today which has resolved.  Denies abdominal pain, constipation, headache, congestion, cough, dysuria.  Has had issues with urinary retention but says he feels like that has resolved and does not feel like he is having difficulty emptying his bladder.  Has a history of a stroke earlier this year without any significant persistent deficits has any new weakness, numbness, tingling.     In ED Pt has been resting comfortably. He is A&Ox4 and on RA. He has been on the low side of normal BP 90's over 60's. VS have been WDL. He is able to stand at bedside and use a urinal. Pt had a K+ of 2.6 and has received 1 run of K+ IV and 40mg po K+ so far. Pt has a 20g in RFA and has had 1L LR. His son is at bedside.  Mental Status: oriented, alert, coherent, logical, and thought processes intact  Arrived from: home    Imaging:   XR CHEST PORTABLE   Final Result   Right apical nodule again seen concerning for neoplasm.  Recommend PET-CT or biopsy.       No acute cardiopulmonary findings.

## 2024-09-29 NOTE — PROGRESS NOTES
4 Eyes Skin Assessment     NAME:  Jairon Rose  YOB: 1945  MEDICAL RECORD NUMBER:  [id number]    The patient is being assessed for  Admission    I agree that at least one RN has performed a thorough Head to Toe Skin Assessment on the patient. ALL assessment sites listed below have been assessed.      Areas assessed by both nurses:    Head, Face, Ears, Shoulders, Back, Chest, Arms, Elbows, Hands, Sacrum. Buttock, Coccyx, Ischium, Legs. Feet and Heels, and Under Medical Devices         Does the Patient have a Wound? No noted wound(s)       Tello Prevention initiated by RN: No  Wound Care Orders initiated by RN: No    Pressure Injury (Stage 3,4, Unstageable, DTI, NWPT, and Complex wounds) if present, place Wound referral order by RN under : No    New Ostomies, if present place, Ostomy referral order under : No     Nurse 1 eSignature: Electronically signed by Zafar Aguirre RN on 9/29/24 at 1:13 AM CDT    **SHARE this note so that the co-signing nurse can place an eSignature**    Nurse 2 eSignature: Electronically signed by Maureen Kan RN on 9/29/24 at 3:32 AM CDT

## 2024-09-29 NOTE — ED PROVIDER NOTES
[facility] EMERGENCY DEPT  EMERGENCY DEPARTMENT ENCOUNTER      Pt Name: Jairon Rose  MRN: [id number]  Birthdate 1945  Date of evaluation: 9/28/2024  Provider: Jesus Dickson Jr, MD    CHIEF COMPLAINT       Chief Complaint   Patient presents with    Hypotension     Family checked BP at home and it was 101/50 and , brought him in for concern of abnormal vitals; 122/58 in triage         HISTORY OF PRESENT ILLNESS   (Location/Symptom, Timing/Onset,Context/Setting, Quality, Duration, Modifying Factors, Severity)  Note limiting factors.   Jairon Rose is a 79 y.o. male who presents to the emergency department for evaluation after he has been \"out of it\" throughout the day according to family.  Started having chills last night and then low-grade fever today per family.  They checked his blood pressure and noticed that it was low and that his heart rate was persistently 120s to 130s.  Patient denies any specific symptoms other than some nausea and vomiting today which has resolved.  Denies abdominal pain, constipation, headache, congestion, cough, dysuria.  Has had issues with urinary retention but says he feels like that has resolved and does not feel like he is having difficulty emptying his bladder.  Has a history of a stroke earlier this year without any significant persistent deficits has any new weakness, numbness, tingling.    HPI    NursingNotes were reviewed.    REVIEW OF SYSTEMS    (2-9 systems for level 4, 10 or more for level 5)     Review of Systems   Constitutional:  Positive for chills and fatigue.   HENT: Negative.     Eyes: Negative.    Respiratory: Negative.     Cardiovascular: Negative.    Gastrointestinal:  Positive for nausea and vomiting.   Genitourinary: Negative.    Musculoskeletal: Negative.    Skin: Negative.    Neurological: Negative.    Hematological: Negative.    Psychiatric/Behavioral:  Positive for confusion.        A complete review of systems was performed and is negative except as noted

## 2024-09-30 VITALS
BODY MASS INDEX: 19.16 KG/M2 | WEIGHT: 115 LBS | DIASTOLIC BLOOD PRESSURE: 59 MMHG | SYSTOLIC BLOOD PRESSURE: 128 MMHG | OXYGEN SATURATION: 97 % | TEMPERATURE: 98.3 F | HEIGHT: 65 IN | HEART RATE: 57 BPM | RESPIRATION RATE: 17 BRPM

## 2024-09-30 LAB
ANION GAP SERPL CALCULATED.3IONS-SCNC: 11 MMOL/L (ref 7–19)
BASOPHILS # BLD: 0 K/UL (ref 0–0.2)
BASOPHILS NFR BLD: 0.2 % (ref 0–1)
BUN SERPL-MCNC: 23 MG/DL (ref 8–23)
CALCIUM SERPL-MCNC: 7.7 MG/DL (ref 8.8–10.2)
CHLORIDE SERPL-SCNC: 107 MMOL/L (ref 98–111)
CO2 SERPL-SCNC: 20 MMOL/L (ref 22–29)
CREAT SERPL-MCNC: 0.9 MG/DL (ref 0.7–1.2)
EOSINOPHIL # BLD: 0.1 K/UL (ref 0–0.6)
EOSINOPHIL NFR BLD: 1 % (ref 0–5)
ERYTHROCYTE [DISTWIDTH] IN BLOOD BY AUTOMATED COUNT: 15.1 % (ref 11.5–14.5)
GLUCOSE SERPL-MCNC: 106 MG/DL (ref 70–99)
HCT VFR BLD AUTO: 30.3 % (ref 42–52)
HGB BLD-MCNC: 9.8 G/DL (ref 14–18)
IMM GRANULOCYTES # BLD: 0.1 K/UL
LYMPHOCYTES # BLD: 1.2 K/UL (ref 1.1–4.5)
LYMPHOCYTES NFR BLD: 13.4 % (ref 20–40)
MCH RBC QN AUTO: 31.5 PG (ref 27–31)
MCHC RBC AUTO-ENTMCNC: 32.3 G/DL (ref 33–37)
MCV RBC AUTO: 97.4 FL (ref 80–94)
MONOCYTES # BLD: 0.4 K/UL (ref 0–0.9)
MONOCYTES NFR BLD: 4.9 % (ref 0–10)
NEUTROPHILS # BLD: 7.2 K/UL (ref 1.5–7.5)
NEUTS SEG NFR BLD: 79.9 % (ref 50–65)
PLATELET # BLD AUTO: 130 K/UL (ref 130–400)
PLATELET SLIDE REVIEW: ADEQUATE
PMV BLD AUTO: 11.2 FL (ref 9.4–12.4)
POTASSIUM SERPL-SCNC: 4.4 MMOL/L (ref 3.5–5)
RBC # BLD AUTO: 3.11 M/UL (ref 4.7–6.1)
SODIUM SERPL-SCNC: 138 MMOL/L (ref 136–145)
WBC # BLD AUTO: 9 K/UL (ref 4.8–10.8)

## 2024-09-30 PROCEDURE — 80048 BASIC METABOLIC PNL TOTAL CA: CPT

## 2024-09-30 PROCEDURE — 2580000003 HC RX 258: Performed by: HOSPITALIST

## 2024-09-30 PROCEDURE — 85025 COMPLETE CBC W/AUTO DIFF WBC: CPT

## 2024-09-30 PROCEDURE — 6370000000 HC RX 637 (ALT 250 FOR IP): Performed by: HOSPITALIST

## 2024-09-30 PROCEDURE — 36415 COLL VENOUS BLD VENIPUNCTURE: CPT

## 2024-09-30 PROCEDURE — 94760 N-INVAS EAR/PLS OXIMETRY 1: CPT

## 2024-09-30 RX ORDER — CEFDINIR 300 MG/1
300 CAPSULE ORAL 2 TIMES DAILY
Qty: 10 CAPSULE | Refills: 0 | Status: SHIPPED | OUTPATIENT
Start: 2024-09-30 | End: 2024-10-05

## 2024-09-30 RX ADMIN — METOPROLOL TARTRATE 25 MG: 25 TABLET, FILM COATED ORAL at 07:47

## 2024-09-30 RX ADMIN — SODIUM CHLORIDE, PRESERVATIVE FREE 10 ML: 5 INJECTION INTRAVENOUS at 07:47

## 2024-09-30 RX ADMIN — Medication 400 MG: at 07:47

## 2024-09-30 RX ADMIN — FINASTERIDE 5 MG: 5 TABLET, FILM COATED ORAL at 07:47

## 2024-09-30 RX ADMIN — APIXABAN 2.5 MG: 2.5 TABLET, FILM COATED ORAL at 07:47

## 2024-09-30 NOTE — DISCHARGE INSTR - DIET

## 2024-09-30 NOTE — DISCHARGE SUMMARY
[facility]    Discharge Summary      Jairon Rose  :  1945  MRN:  [id number]    Admit date:  2024  Discharge date:    2024    Discharging Physician:  Dr. Cardenas    Advance Directive: Full Code    Consults: none    Primary Care Physician:  Segundo Phipps MD    Discharge Diagnoses:  Principal Problem:    Sepsis secondary to UTI (HCC)  Active Problems:    Leukocytosis    Palliative care patient    Hypokalemia    UTI (urinary tract infection)    Pulmonary nodule, right  Resolved Problems:    * No resolved hospital problems. *      Portions of this note have been copied forward, however, changed to reflect the most current clinical status of this patient.    Hospital Course:    This patient is an 79-year-old male with PMH BPH with intermittent self catheterization, CVA, hypertension, and atrial fibrillation on Eliquis who presented to [facility] ED on  for evaluation of subjective fever, chills, malaise, nausea, and vomiting.  He was found to have elevated procalcitonin at 4.66, lactic 2.5 with repeat 1.0, and WBC 14 in ED.  Urinalysis indicated positive nitrites and large LE with 21-30 WBCs.  Admitted to hospitalist with sepsis secondary to UTI.  He was given Rocephin inpatient.  Blood cultures show no growth.  Preliminary urine culture growing Klebsiella pneumoniae; will transition to Omnicef x 5 days on discharge.  Patient's symptoms have improved and he is requesting to be discharged at this time.  He did have an episode of atrial fibrillation with -140 with exertion while going to the bathroom, however he was asymptomatic with this and his heart rate improved to 50-60 with rest. Recommended to continue taking metoprolol and Eliquis and follow-up with his PCP or cardiology should his tachycardia continue. Will hold off on increasing metoprolol at this time as he is bradycardic at rest. Patient and family verbalize understanding.    Patient is currently in stable condition to be  daily     metoprolol tartrate 25 MG tablet  Commonly known as: LOPRESSOR     tamsulosin 0.4 MG capsule  Commonly known as: FLOMAX  Take 2 capsules by mouth at bedtime            STOP taking these medications      potassium chloride 20 MEQ extended release tablet  Commonly known as: KLOR-CON M            ASK your doctor about these medications      atorvastatin 80 MG tablet  Commonly known as: LIPITOR               Where to Get Your Medications        These medications were sent to [id number] - [place], [place] - [address] [facility] - P [phone] - F [phone]  [address] [place] [address]      Phone: [phone]   cefdinir 300 MG capsule          Discharge Instructions:   Follow up with Segundo Phipps MD in 7 days.    Follow up with cardiology as needed.    Take medications as directed.      Resume activity as tolerated.    Followup Appointments Scheduled at Time of Discharge:  Future Appointments   Date Time Provider Department Center   3/5/2025  9:30 AM Sofia Fernando APRN - CNP [facility]        Diet: ADULT DIET; Regular     Disposition: Patient is medically stable and will be discharged today.    Time spent on discharge 32 minutes spent in assessing patient, reviewing medications, discussion with nursing, confirming safe discharge plan and preparation of discharge summary.    Signed:  Electronically signed by AUBREE Iqbal CNP on 9/30/24 at 12:20 PM CDT     EMR Dragon/Transcription disclaimer:   Much of this encounter note is an electronic transcription/translation of spoken language to printed text. The electronic translation of spoken language may permit erroneous, or at times, nonsensical words or phrases to be inadvertently transcribed; although attempts have made to review the note for such errors, some may still exist.

## 2024-09-30 NOTE — CONSULTS
Palliative Care:   Known to Palliative Care.         Pt presented with fever and chills and hypotension.  Current treatment for UTI.  History of a stroke earlier this year, but without any significant deficits.  Pt is alert and tells me he hopes he is going home today.  His wife and daughter are present and confirm he has good support.  Pt tell me they've been  57 years.         Past Medical History:        Past Medical History:   Diagnosis Date    Palliative care patient 08/13/2024       Advance Directives:   Full code  Noted on review of ACP note,  pt did not  want intubation.  Will re-visit to clarify wishes.         Pain/Other Symptoms:    Pt denies pain at this time.               Psychological/Spiritual:   Pt has good spiritual support from [facility].                Plan:     Medical management and monitoring.       Patient/family discussion r/t goals:           Return home with family, and resume previous level of activity.           Palliative Performance Scale:      70      Palliative Care team will continue to follow and support, with ongoing review of pt's goals of care.                Electronically signed by Radha Jose RN on 9/30/2024 at 11:47 AM

## 2024-10-01 ENCOUNTER — TELEPHONE (OUTPATIENT)
Dept: OTHER | Age: 79
End: 2024-10-01

## 2024-10-01 LAB
BACTERIA UR CULT: ABNORMAL
BACTERIA UR CULT: ABNORMAL
ORGANISM: ABNORMAL

## 2024-10-01 NOTE — TELEPHONE ENCOUNTER
Pt seen in ER on 9/28/2024.  Chest XR revealed pulmonary nodule.  Radiologist recommended PET CT scan or biopsy.  Report faxed to Dr. Phipps.  Son called back and said they were aware and the patient does not want any treatment.  He had a stroke 2 months ago.

## 2024-10-04 LAB
BACTERIA BLD CULT ORG #2: NORMAL
BACTERIA BLD CULT: NORMAL

## 2024-10-04 NOTE — PROGRESS NOTES
Physician Progress Note      PATIENT:               SANTI DÍAZ  CSN #:                  [id number]  :                       1945  ADMIT DATE:       2024 8:44 PM  DISCH DATE:        2024 1:33 PM  RESPONDING  PROVIDER #:        Bertrand Cardenas MD          QUERY TEXT:    Patient admitted with Sepsis with UTI. Documentation reflects Septic Shock in   H&P.  If possible, please document in the progress notes and discharge summary   if Septic Shock was:    The medical record reflects the following:  Risk Factors: Sepsis with UTI  Clinical Indicators: Per H&P: Fluid Responsive Septic Shock secondary to UTI,   IVF at 95cc/h NS for maintenance  Got LR Bolus of 1566cc in ED as per Sepsis guidelines. LA at 1.9. BP (!) 90/54      Treatment:  rocephin 1g iv    Thank You  EARLINE DawsonN, RN  Clinical [employer] Integrity  Options provided:  -- Septic Shock confirmed after study, please provide additional evidence for   septic shock.  -- Septic Shock ruled out after study  -- Other - I will add my own diagnosis  -- Disagree - Not applicable / Not valid  -- Disagree - Clinically unable to determine / Unknown  -- Refer to Clinical Documentation Reviewer    PROVIDER RESPONSE TEXT:    Septic Shock ruled out after study    Query created by: Gee Thayer on 10/4/2024 1:30 PM      Electronically signed by:  Bertrand Cardenas MD 10/4/2024 1:34 PM

## 2024-10-28 PROBLEM — N39.0 UTI (URINARY TRACT INFECTION): Status: RESOLVED | Noted: 2024-09-28 | Resolved: 2024-10-28

## 2025-01-27 ENCOUNTER — TELEPHONE (OUTPATIENT)
Dept: UROLOGY | Age: 80
End: 2025-01-27

## 2025-01-27 DIAGNOSIS — R33.8 BENIGN PROSTATIC HYPERPLASIA WITH URINARY RETENTION: ICD-10-CM

## 2025-01-27 DIAGNOSIS — R33.9 URINARY RETENTION: ICD-10-CM

## 2025-01-27 DIAGNOSIS — N40.1 BENIGN PROSTATIC HYPERPLASIA WITH URINARY RETENTION: ICD-10-CM

## 2025-01-27 RX ORDER — TAMSULOSIN HYDROCHLORIDE 0.4 MG/1
0.8 CAPSULE ORAL NIGHTLY
Qty: 180 CAPSULE | Refills: 3 | Status: SHIPPED | OUTPATIENT
Start: 2025-01-27

## 2025-08-28 DIAGNOSIS — N40.1 BENIGN PROSTATIC HYPERPLASIA WITH URINARY RETENTION: ICD-10-CM

## 2025-08-28 DIAGNOSIS — R33.8 BENIGN PROSTATIC HYPERPLASIA WITH URINARY RETENTION: ICD-10-CM

## 2025-08-29 RX ORDER — FINASTERIDE 5 MG/1
5 TABLET, FILM COATED ORAL DAILY
Qty: 90 TABLET | Refills: 0 | Status: SHIPPED | OUTPATIENT
Start: 2025-08-29